# Patient Record
Sex: MALE | Race: WHITE | NOT HISPANIC OR LATINO | Employment: UNEMPLOYED | URBAN - METROPOLITAN AREA
[De-identification: names, ages, dates, MRNs, and addresses within clinical notes are randomized per-mention and may not be internally consistent; named-entity substitution may affect disease eponyms.]

---

## 2021-08-21 ENCOUNTER — HOSPITAL ENCOUNTER (EMERGENCY)
Facility: MEDICAL CENTER | Age: 31
End: 2021-08-21
Attending: EMERGENCY MEDICINE

## 2021-08-21 VITALS
HEART RATE: 92 BPM | RESPIRATION RATE: 24 BRPM | OXYGEN SATURATION: 97 % | WEIGHT: 189.6 LBS | HEIGHT: 71 IN | DIASTOLIC BLOOD PRESSURE: 98 MMHG | TEMPERATURE: 100.3 F | BODY MASS INDEX: 26.54 KG/M2 | SYSTOLIC BLOOD PRESSURE: 168 MMHG

## 2021-08-21 DIAGNOSIS — U07.1 COVID-19 VIRUS INFECTION: ICD-10-CM

## 2021-08-21 LAB
ALBUMIN SERPL BCP-MCNC: 4.1 G/DL (ref 3.2–4.9)
ALBUMIN/GLOB SERPL: 1.5 G/DL
ALP SERPL-CCNC: 90 U/L (ref 30–99)
ALT SERPL-CCNC: 15 U/L (ref 2–50)
ANION GAP SERPL CALC-SCNC: 12 MMOL/L (ref 7–16)
AST SERPL-CCNC: 20 U/L (ref 12–45)
BASOPHILS # BLD AUTO: 0.8 % (ref 0–1.8)
BASOPHILS # BLD: 0.06 K/UL (ref 0–0.12)
BILIRUB SERPL-MCNC: 0.3 MG/DL (ref 0.1–1.5)
BUN SERPL-MCNC: 20 MG/DL (ref 8–22)
CALCIUM SERPL-MCNC: 8.7 MG/DL (ref 8.4–10.2)
CHLORIDE SERPL-SCNC: 103 MMOL/L (ref 96–112)
CO2 SERPL-SCNC: 22 MMOL/L (ref 20–33)
CREAT SERPL-MCNC: 0.97 MG/DL (ref 0.5–1.4)
EOSINOPHIL # BLD AUTO: 0.05 K/UL (ref 0–0.51)
EOSINOPHIL NFR BLD: 0.7 % (ref 0–6.9)
ERYTHROCYTE [DISTWIDTH] IN BLOOD BY AUTOMATED COUNT: 42.5 FL (ref 35.9–50)
FLUAV RNA SPEC QL NAA+PROBE: NEGATIVE
FLUBV RNA SPEC QL NAA+PROBE: NEGATIVE
GLOBULIN SER CALC-MCNC: 2.8 G/DL (ref 1.9–3.5)
GLUCOSE SERPL-MCNC: 85 MG/DL (ref 65–99)
HCT VFR BLD AUTO: 43.1 % (ref 42–52)
HGB BLD-MCNC: 14.9 G/DL (ref 14–18)
IMM GRANULOCYTES # BLD AUTO: 0.01 K/UL (ref 0–0.11)
IMM GRANULOCYTES NFR BLD AUTO: 0.1 % (ref 0–0.9)
LACTATE BLD-SCNC: 1.5 MMOL/L (ref 0.5–2)
LYMPHOCYTES # BLD AUTO: 1.43 K/UL (ref 1–4.8)
LYMPHOCYTES NFR BLD: 19.9 % (ref 22–41)
MCH RBC QN AUTO: 32.1 PG (ref 27–33)
MCHC RBC AUTO-ENTMCNC: 34.6 G/DL (ref 33.7–35.3)
MCV RBC AUTO: 92.9 FL (ref 81.4–97.8)
MONOCYTES # BLD AUTO: 0.68 K/UL (ref 0–0.85)
MONOCYTES NFR BLD AUTO: 9.5 % (ref 0–13.4)
NEUTROPHILS # BLD AUTO: 4.96 K/UL (ref 1.82–7.42)
NEUTROPHILS NFR BLD: 69 % (ref 44–72)
NRBC # BLD AUTO: 0 K/UL
NRBC BLD-RTO: 0 /100 WBC
PLATELET # BLD AUTO: 237 K/UL (ref 164–446)
PMV BLD AUTO: 9.9 FL (ref 9–12.9)
POTASSIUM SERPL-SCNC: 3.8 MMOL/L (ref 3.6–5.5)
PROT SERPL-MCNC: 6.9 G/DL (ref 6–8.2)
RBC # BLD AUTO: 4.64 M/UL (ref 4.7–6.1)
RSV RNA SPEC QL NAA+PROBE: NEGATIVE
S PYO DNA SPEC NAA+PROBE: NOT DETECTED
SARS-COV-2 RNA RESP QL NAA+PROBE: DETECTED
SODIUM SERPL-SCNC: 137 MMOL/L (ref 135–145)
SPECIMEN SOURCE: ABNORMAL
WBC # BLD AUTO: 7.2 K/UL (ref 4.8–10.8)

## 2021-08-21 PROCEDURE — 700102 HCHG RX REV CODE 250 W/ 637 OVERRIDE(OP): Performed by: EMERGENCY MEDICINE

## 2021-08-21 PROCEDURE — C9803 HOPD COVID-19 SPEC COLLECT: HCPCS | Performed by: EMERGENCY MEDICINE

## 2021-08-21 PROCEDURE — 87040 BLOOD CULTURE FOR BACTERIA: CPT

## 2021-08-21 PROCEDURE — A9270 NON-COVERED ITEM OR SERVICE: HCPCS | Performed by: EMERGENCY MEDICINE

## 2021-08-21 PROCEDURE — 87651 STREP A DNA AMP PROBE: CPT

## 2021-08-21 PROCEDURE — 80053 COMPREHEN METABOLIC PANEL: CPT

## 2021-08-21 PROCEDURE — 83605 ASSAY OF LACTIC ACID: CPT

## 2021-08-21 PROCEDURE — 85025 COMPLETE CBC W/AUTO DIFF WBC: CPT

## 2021-08-21 PROCEDURE — 0241U HCHG SARS-COV-2 COVID-19 NFCT DS RESP RNA 4 TRGT MIC: CPT

## 2021-08-21 PROCEDURE — 99283 EMERGENCY DEPT VISIT LOW MDM: CPT

## 2021-08-21 RX ORDER — ACETAMINOPHEN 325 MG/1
650 TABLET ORAL ONCE
Status: COMPLETED | OUTPATIENT
Start: 2021-08-21 | End: 2021-08-21

## 2021-08-21 RX ADMIN — ACETAMINOPHEN 650 MG: 325 TABLET, FILM COATED ORAL at 20:20

## 2021-08-22 NOTE — ED TRIAGE NOTES
"Presents complaining of generalized body aches, nasal congestion, sore throat, and fever recurring throughout the day.  Chief Complaint   Patient presents with   • Sore Throat   • Body Aches   • Nasal Congestion   • Fever     BP (!) 168/98   Pulse (!) 110   Temp (!) 38.8 °C (101.8 °F) (Oral)   Resp 20   Ht 1.803 m (5' 11\")   Wt 86 kg (189 lb 9.5 oz)   SpO2 96%   BMI 26.44 kg/m²      "

## 2021-08-22 NOTE — ED PROVIDER NOTES
"ED Provider Note    CHIEF COMPLAINT  Chief Complaint   Patient presents with   • Sore Throat   • Body Aches   • Nasal Congestion   • Fever       HPI  Jhon Greco is a 31 y.o. male who presents with complaints of fever, chills, sore throat, runny nose, since earlier this morning.  Patient said he woke up this morning and felt like he was having a fever and chills.  He has had occasional cough which is been nonproductive.  He has been tolerating food and fluids, denies any nausea, vomiting, or diarrhea.  He still has his sense of taste and smell.  Patient denies any sick contacts.  He has not had any significant headache, or muscle aches and pains.  The patient has not been vaccinated for Covid.    REVIEW OF SYSTEMS  See HPI for further details. All other systems are negative.     PAST MEDICAL HISTORY  No past medical history on file.    FAMILY HISTORY  No family history on file.    SOCIAL HISTORY  Social History     Tobacco Use   • Smoking status: Not on file   Substance Use Topics   • Alcohol use: Not on file   • Drug use: Not on file      Social History     Substance and Sexual Activity   Drug Use Not on file       SURGICAL HISTORY  No past surgical history on file.    CURRENT MEDICATIONS  Home Medications    **Home medications have not yet been reviewed for this encounter**         ALLERGIES  No Known Allergies    PHYSICAL EXAM0  VITAL SIGNS: Blood Pressure (Abnormal) 168/98   Pulse 92   Temperature 37.9 °C (100.3 °F) (Oral)   Respiration (Abnormal) 24   Height 1.803 m (5' 11\")   Weight 86 kg (189 lb 9.5 oz)   Oxygen Saturation 97%   Body Mass Index 26.44 kg/m²   Constitutional: Awake, alert, in no acute distress, Non-toxic appearance.   HENT: Atraumatic. Bilateral external ears normal, mucous membranes moist, throat is mildly erythematous without exudates, no swelling to the soft or uvula, nose is normal.  Eyes: PERRL, EOMI, conjunctiva moist, noninjected.  Neck: Nontender, Normal range of motion, No " "nuchal rigidity, No stridor.   Lymphatic: No lymphadenopathy noted.   Cardiovascular: Regular rate and rhythm, no murmurs, rubs, gallops.  Thorax & Lungs:  Good breath sounds bilaterally, no wheezes, rales, or retractions.  No chest tenderness.  Abdomen: Bowel sounds normal, Soft, nontender, nondistended, no rebound, guarding, masses.  Back: No CVA or spinal tenderness.  Extremities: Intact distal pulses, No edema, No tenderness.   Skin: Warm, slightly diaphoretic, no rashes.  Musculoskeletal: No joint swelling or tenderness.  Neurologic: Alert & oriented x 3, sensory and motor function normal. No focal deficits.   Psychiatric: Affect normal, Judgment normal, Mood normal.           LABS  Labs Reviewed   CBC WITH DIFFERENTIAL - Abnormal; Notable for the following components:       Result Value    RBC 4.64 (*)     Lymphocytes 19.90 (*)     All other components within normal limits   COV-2, FLU A/B, AND RSV BY PCR - Abnormal; Notable for the following components:    SARS-CoV-2 by PCR DETECTED (*)     All other components within normal limits    Narrative:     Have you been in close contact with a person who is suspected  or known to be positive for COVID-19 within the last 30 days  (e.g. last seen that person < 30 days ago)->Unknown   COMP METABOLIC PANEL   BLOOD CULTURE    Narrative:     1 of 2 for Blood Culture x 2 sites order. Per Hospital  Policy: Only change Specimen Src: to \"Line\" if specified by  physician order.   BLOOD CULTURE    Narrative:     2 of 2 blood culture x2  Sites order. Per Hospital Policy:  Only change Specimen Src: to \"Line\" if specified by physician  order.   GROUP A STREP BY PCR    Narrative:     Have you been in close contact with a person who is suspected  or known to be positive for COVID-19 within the last 30 days  (e.g. last seen that person < 30 days ago)->Unknown   LACTIC ACID   ESTIMATED GFR   LACTIC ACID   LACTIC ACID       All labs reviewed by me.      RADIOLOGY/PROCEDURES  No orders " to display       The radiologist's interpretations of all radiological studies have been reviewed by me.        COURSE & MEDICAL DECISION MAKING  Pertinent Labs & Imaging studies reviewed. (See chart for details)  The patient presents with the above complaints.  He was febrile with a temperature of 101.8 and tachycardic with a heart rate of 110.  Oxygen saturation is good at 96% on room air.  Because of the possibility for Covid infection he was given IV fluids.  He was given Tylenol 650 mg orally for his fever.    CBC is normal with a white count 7200, normal differential, chemistry profile normal, group A strep by PCR was negative.  Influenza by PCR was negative, Covid by PCR was detected.  Patient appears to have a Covid virus infection.  He is not hypoxic, does not have any significant coughing at this time.  Findings were discussed with the patient.  He is still he will need to quarantine.  He is to return to the ER for any worsening symptoms, difficulty breathing, dizziness, vomiting, or any other problems.        FINAL IMPRESSION  1. COVID-19 virus infection          Electronically signed by: Wade Bliss M.D., 8/21/2021 8:20 PM

## 2021-08-22 NOTE — ED NOTES
Desmond from Lab called with critical result of positive COVID at 2154. Critical lab result read back to Desmond.   Dr. Bliss notified of critical lab result at 2154.  Critical lab result read back by Dr. Bliss.

## 2021-08-26 LAB
BACTERIA BLD CULT: NORMAL
BACTERIA BLD CULT: NORMAL
SIGNIFICANT IND 70042: NORMAL
SIGNIFICANT IND 70042: NORMAL
SITE SITE: NORMAL
SITE SITE: NORMAL
SOURCE SOURCE: NORMAL
SOURCE SOURCE: NORMAL

## 2021-12-13 ENCOUNTER — HOSPITAL ENCOUNTER (EMERGENCY)
Facility: MEDICAL CENTER | Age: 31
End: 2021-12-13
Attending: EMERGENCY MEDICINE
Payer: MEDICAID

## 2021-12-13 ENCOUNTER — APPOINTMENT (OUTPATIENT)
Dept: RADIOLOGY | Facility: MEDICAL CENTER | Age: 31
End: 2021-12-13
Attending: EMERGENCY MEDICINE
Payer: MEDICAID

## 2021-12-13 VITALS
TEMPERATURE: 98.8 F | BODY MASS INDEX: 27.04 KG/M2 | WEIGHT: 193.12 LBS | SYSTOLIC BLOOD PRESSURE: 156 MMHG | DIASTOLIC BLOOD PRESSURE: 98 MMHG | HEIGHT: 71 IN | HEART RATE: 85 BPM | OXYGEN SATURATION: 95 % | RESPIRATION RATE: 18 BRPM

## 2021-12-13 DIAGNOSIS — S62.622A CLOSED DISPLACED FRACTURE OF MIDDLE PHALANX OF RIGHT MIDDLE FINGER, INITIAL ENCOUNTER: ICD-10-CM

## 2021-12-13 PROCEDURE — 99283 EMERGENCY DEPT VISIT LOW MDM: CPT

## 2021-12-13 PROCEDURE — 73140 X-RAY EXAM OF FINGER(S): CPT | Mod: RT

## 2021-12-13 RX ORDER — NAPROXEN 500 MG/1
500 TABLET ORAL 2 TIMES DAILY PRN
Qty: 20 TABLET | Refills: 0 | Status: SHIPPED | OUTPATIENT
Start: 2021-12-13

## 2021-12-13 ASSESSMENT — FIBROSIS 4 INDEX: FIB4 SCORE: 0.68

## 2021-12-14 NOTE — ED TRIAGE NOTES
"Chief Complaint   Patient presents with   • Sent by MD boudreaux finger about a week ago, PCP took xray today and it showed fracture in middle finger      BP (!) 173/88   Pulse 84   Temp 36.3 °C (97.4 °F) (Temporal)   Resp 14   Ht 1.803 m (5' 11\")   Wt 87.6 kg (193 lb 2 oz)   SpO2 97%   BMI 26.94 kg/m²     Has this patient been vaccinated for COVID no  If not, would they like to be vaccinated while in the ER if eligible?  no  Would the patient like to speak with the ERP about the possibility of receiving the COVID vaccine today before making a decision? no    "

## 2021-12-14 NOTE — ED PROVIDER NOTES
CHIEF COMPLAINT  Chief Complaint   Patient presents with   • Sent by MD     smashed finger about a week ago, PCP took xray today and it showed fracture in middle finger        HPI  Jhon Greco is a 31 y.o. male who presents after he smashed his finger about a week ago.  He went to see his PCP today who took an x-ray and apparently was fractured.  I do not have access to the records for that.  The patient notes no lacerations.  He notes pain mostly in the distal aspect of the digit.  No paresthesias.  No other injuries.    REVIEW OF SYSTEMS  No paresthesias or weakness    PAST MEDICAL HISTORY  No past medical history on file.    FAMILY HISTORY  No family history on file.    SOCIAL HISTORY  Social History     Socioeconomic History   • Marital status: Single     Spouse name: Not on file   • Number of children: Not on file   • Years of education: Not on file   • Highest education level: Not on file   Occupational History   • Not on file   Tobacco Use   • Smoking status: Never Smoker   • Smokeless tobacco: Never Used   Vaping Use   • Vaping Use: Never used   Substance and Sexual Activity   • Alcohol use: Not Currently   • Drug use: Not Currently   • Sexual activity: Not on file   Other Topics Concern   • Not on file   Social History Narrative   • Not on file     Social Determinants of Health     Financial Resource Strain:    • Difficulty of Paying Living Expenses: Not on file   Food Insecurity:    • Worried About Running Out of Food in the Last Year: Not on file   • Ran Out of Food in the Last Year: Not on file   Transportation Needs:    • Lack of Transportation (Medical): Not on file   • Lack of Transportation (Non-Medical): Not on file   Physical Activity:    • Days of Exercise per Week: Not on file   • Minutes of Exercise per Session: Not on file   Stress:    • Feeling of Stress : Not on file   Social Connections:    • Frequency of Communication with Friends and Family: Not on file   • Frequency of Social Gatherings  "with Friends and Family: Not on file   • Attends [employer] Services: Not on file   • Active Member of Clubs or Organizations: Not on file   • Attends Club or Organization Meetings: Not on file   • Marital Status: Not on file   Intimate Partner Violence:    • Fear of Current or Ex-Partner: Not on file   • Emotionally Abused: Not on file   • Physically Abused: Not on file   • Sexually Abused: Not on file   Housing Stability:    • Unable to Pay for Housing in the Last Year: Not on file   • Number of Places Lived in the Last Year: Not on file   • Unstable Housing in the Last Year: Not on file       SURGICAL HISTORY  No past surgical history on file.    CURRENT MEDICATIONS  Home Medications    **Home medications have not yet been reviewed for this encounter**         ALLERGIES  No Known Allergies    PHYSICAL EXAM  VITAL SIGNS: BP (!) 173/88   Pulse 84   Temp 36.3 °C (97.4 °F) (Temporal)   Resp 14   Ht 1.803 m (5' 11\")   Wt 87.6 kg (193 lb 2 oz)   SpO2 97%   BMI 26.94 kg/m²      Constitutional: Well developed, Well nourished, No acute distress, Non-toxic appearance.   HENT: Normocephalic, Atraumatic  Cardiovascular: Regular pulse  Lungs: No respiratory distress  Skin: Warm, Dry, no rash  Extremities: Right middle finger demonstrates some soft tissue swelling around the DIP and distal in the finger, there is no evidence of subungual hematoma, there is no erythema and no evidence of abscess, flexion extension at both the PIP and DIP is intact, sensation is normal, cap refill is normal  Neurologic: Alert, appropriate, follows commands  Psychiatric: Affect normal    RADIOLOGY/PROCEDURES  DX-FINGER(S) 2+ RIGHT   Final Result      Minimally displaced intra-articular fracture of the third distal phalangeal base.            COURSE & MEDICAL DECISION MAKING  Pertinent Labs & Imaging studies reviewed. (See chart for details)  This is a 31-year-old male who presents with a intra-articular fracture that goes into the DIP " joint. It is minimally displaced. The patient appears to be neurovascularly intact. He will be splinted and given follow-up with the [facility].    FINAL IMPRESSION  1. Finger fracture  2.   3.         Electronically signed by: Mekhi Keith M.D., 12/13/2021 5:51 PM

## 2022-08-31 ENCOUNTER — HOSPITAL ENCOUNTER (EMERGENCY)
Facility: MEDICAL CENTER | Age: 32
End: 2022-08-31
Attending: EMERGENCY MEDICINE
Payer: MEDICAID

## 2022-08-31 ENCOUNTER — APPOINTMENT (OUTPATIENT)
Dept: RADIOLOGY | Facility: MEDICAL CENTER | Age: 32
End: 2022-08-31
Attending: EMERGENCY MEDICINE
Payer: MEDICAID

## 2022-08-31 VITALS
TEMPERATURE: 99 F | OXYGEN SATURATION: 94 % | WEIGHT: 195.11 LBS | HEIGHT: 71 IN | HEART RATE: 80 BPM | DIASTOLIC BLOOD PRESSURE: 80 MMHG | RESPIRATION RATE: 18 BRPM | SYSTOLIC BLOOD PRESSURE: 170 MMHG | BODY MASS INDEX: 27.31 KG/M2

## 2022-08-31 DIAGNOSIS — L03.116 CELLULITIS OF LEFT LOWER EXTREMITY: ICD-10-CM

## 2022-08-31 DIAGNOSIS — T14.8XXA PUNCTURE WOUND: ICD-10-CM

## 2022-08-31 LAB
BASOPHILS # BLD AUTO: 0.7 % (ref 0–1.8)
BASOPHILS # BLD: 0.06 K/UL (ref 0–0.12)
EOSINOPHIL # BLD AUTO: 0.14 K/UL (ref 0–0.51)
EOSINOPHIL NFR BLD: 1.6 % (ref 0–6.9)
ERYTHROCYTE [DISTWIDTH] IN BLOOD BY AUTOMATED COUNT: 41.6 FL (ref 35.9–50)
HCT VFR BLD AUTO: 40.2 % (ref 42–52)
HGB BLD-MCNC: 14.2 G/DL (ref 14–18)
IMM GRANULOCYTES # BLD AUTO: 0.02 K/UL (ref 0–0.11)
IMM GRANULOCYTES NFR BLD AUTO: 0.2 % (ref 0–0.9)
LYMPHOCYTES # BLD AUTO: 2.13 K/UL (ref 1–4.8)
LYMPHOCYTES NFR BLD: 24.1 % (ref 22–41)
MCH RBC QN AUTO: 32.1 PG (ref 27–33)
MCHC RBC AUTO-ENTMCNC: 35.3 G/DL (ref 33.7–35.3)
MCV RBC AUTO: 90.7 FL (ref 81.4–97.8)
MONOCYTES # BLD AUTO: 0.69 K/UL (ref 0–0.85)
MONOCYTES NFR BLD AUTO: 7.8 % (ref 0–13.4)
NEUTROPHILS # BLD AUTO: 5.81 K/UL (ref 1.82–7.42)
NEUTROPHILS NFR BLD: 65.6 % (ref 44–72)
NRBC # BLD AUTO: 0 K/UL
NRBC BLD-RTO: 0 /100 WBC
PLATELET # BLD AUTO: 256 K/UL (ref 164–446)
PMV BLD AUTO: 10.2 FL (ref 9–12.9)
RBC # BLD AUTO: 4.43 M/UL (ref 4.7–6.1)
WBC # BLD AUTO: 8.9 K/UL (ref 4.8–10.8)

## 2022-08-31 PROCEDURE — 36415 COLL VENOUS BLD VENIPUNCTURE: CPT

## 2022-08-31 PROCEDURE — 96365 THER/PROPH/DIAG IV INF INIT: CPT

## 2022-08-31 PROCEDURE — 73630 X-RAY EXAM OF FOOT: CPT | Mod: LT

## 2022-08-31 PROCEDURE — 85025 COMPLETE CBC W/AUTO DIFF WBC: CPT

## 2022-08-31 PROCEDURE — 700105 HCHG RX REV CODE 258: Performed by: EMERGENCY MEDICINE

## 2022-08-31 PROCEDURE — 99284 EMERGENCY DEPT VISIT MOD MDM: CPT

## 2022-08-31 PROCEDURE — 90715 TDAP VACCINE 7 YRS/> IM: CPT | Performed by: EMERGENCY MEDICINE

## 2022-08-31 PROCEDURE — 90471 IMMUNIZATION ADMIN: CPT

## 2022-08-31 PROCEDURE — 700111 HCHG RX REV CODE 636 W/ 250 OVERRIDE (IP): Performed by: EMERGENCY MEDICINE

## 2022-08-31 RX ORDER — SULFAMETHOXAZOLE AND TRIMETHOPRIM 800; 160 MG/1; MG/1
1 TABLET ORAL 2 TIMES DAILY
Qty: 14 TABLET | Refills: 0 | Status: SHIPPED | OUTPATIENT
Start: 2022-08-31 | End: 2022-09-07

## 2022-08-31 RX ORDER — AMOXICILLIN AND CLAVULANATE POTASSIUM 875; 125 MG/1; MG/1
1 TABLET, FILM COATED ORAL 2 TIMES DAILY
Qty: 20 TABLET | Refills: 0 | Status: SHIPPED | OUTPATIENT
Start: 2022-08-31 | End: 2022-09-10

## 2022-08-31 RX ADMIN — CLOSTRIDIUM TETANI TOXOID ANTIGEN (FORMALDEHYDE INACTIVATED), CORYNEBACTERIUM DIPHTHERIAE TOXOID ANTIGEN (FORMALDEHYDE INACTIVATED), BORDETELLA PERTUSSIS TOXOID ANTIGEN (GLUTARALDEHYDE INACTIVATED), BORDETELLA PERTUSSIS FILAMENTOUS HEMAGGLUTININ ANTIGEN (FORMALDEHYDE INACTIVATED), BORDETELLA PERTUSSIS PERTACTIN ANTIGEN, AND BORDETELLA PERTUSSIS FIMBRIAE 2/3 ANTIGEN 0.5 ML: 5; 2; 2.5; 5; 3; 5 INJECTION, SUSPENSION INTRAMUSCULAR at 20:22

## 2022-08-31 RX ADMIN — SODIUM CHLORIDE 3 G: 900 INJECTION INTRAVENOUS at 20:24

## 2022-08-31 ASSESSMENT — FIBROSIS 4 INDEX: FIB4 SCORE: 0.7

## 2022-09-01 NOTE — ED PROVIDER NOTES
"ED Provider Note    CHIEF COMPLAINT   Chief Complaint   Patient presents with    Wound Check     Pt stepped on a nail with his L foot on Monday. Redness, swelling, warm to touch. Last tetanus 4/2014.        HPI   Jhon Greco is a 32 y.o. male who presents with swelling, redness to the distal foot, injured 2 days ago when a nail went through his shoe into his distal foot.  The puncture wound is healing nicely.  He is developed redness and swelling to the dorsum of his foot with increased pain.  Patient felt chills and subjective fever today.  No dizziness.  Presents with normal heart rate and temperature.  Patient denies diabetes.  Last tetanus shot approximately 8 years ago.    REVIEW OF SYSTEMS   Musculoskeletal: Left foot pain  Neurologic: No numbness  Skin: Redness and swelling dorsum distal left foot, puncture wound plantar surface  Endocrine: No diabetes  Constitutional: Subjective fever today      PAST MEDICAL HISTORY   History reviewed. No pertinent past medical history.    FAMILY HISTORY  History reviewed. No pertinent family history.    SOCIAL HISTORY  Social History     Socioeconomic History    Marital status: Single   Tobacco Use    Smoking status: Never    Smokeless tobacco: Never   Vaping Use    Vaping Use: Never used   Substance and Sexual Activity    Alcohol use: Not Currently    Drug use: Not Currently       SURGICAL HISTORY  History reviewed. No pertinent surgical history.    CURRENT MEDICATIONS   Home Medications       Reviewed by Kylie Kim R.N. (Registered Nurse) on 08/31/22 at 1909  Med List Status: Not Addressed     Medication Last Dose Status   naproxen (NAPROSYN) 500 MG Tab  Active                    ALLERGIES   No Known Allergies    PHYSICAL EXAM  VITAL SIGNS: BP (!) 169/90   Pulse 96   Temp 36.9 °C (98.4 °F) (Temporal)   Resp 17   Ht 1.803 m (5' 11\")   Wt 88.5 kg (195 lb 1.7 oz)   SpO2 95%   BMI 27.21 kg/m²   Skin: Faint erythematous change with slight swelling distal " dorsum of the foot proximal to toes 2 through 4.  No subcutaneous emphysema or crepitance.  Small puncture wound noted, plantar surface of the foot shows no evidence of cellulitis..  No proximal lymphangitis  Vascular: Intact distal capillary refill.   Musculoskeletal: Distal foot is tender to palpation as noted above.  His left ankle is nontender with range of motion good, no evidence of intra-articular infection.  Neurologic: Sensation is normal left foot    RADIOLOGY/PROCEDURES  DX-FOOT-COMPLETE 3+ LEFT   Final Result      No acute findings or radiopaque foreign body identified.        Results for orders placed or performed during the hospital encounter of 08/31/22   CBC WITH DIFFERENTIAL   Result Value Ref Range    WBC 8.9 4.8 - 10.8 K/uL    RBC 4.43 (L) 4.70 - 6.10 M/uL    Hemoglobin 14.2 14.0 - 18.0 g/dL    Hematocrit 40.2 (L) 42.0 - 52.0 %    MCV 90.7 81.4 - 97.8 fL    MCH 32.1 27.0 - 33.0 pg    MCHC 35.3 33.7 - 35.3 g/dL    RDW 41.6 35.9 - 50.0 fL    Platelet Count 256 164 - 446 K/uL    MPV 10.2 9.0 - 12.9 fL    Neutrophils-Polys 65.60 44.00 - 72.00 %    Lymphocytes 24.10 22.00 - 41.00 %    Monocytes 7.80 0.00 - 13.40 %    Eosinophils 1.60 0.00 - 6.90 %    Basophils 0.70 0.00 - 1.80 %    Immature Granulocytes 0.20 0.00 - 0.90 %    Nucleated RBC 0.00 /100 WBC    Neutrophils (Absolute) 5.81 1.82 - 7.42 K/uL    Lymphs (Absolute) 2.13 1.00 - 4.80 K/uL    Monos (Absolute) 0.69 0.00 - 0.85 K/uL    Eos (Absolute) 0.14 0.00 - 0.51 K/uL    Baso (Absolute) 0.06 0.00 - 0.12 K/uL    Immature Granulocytes (abs) 0.02 0.00 - 0.11 K/uL    NRBC (Absolute) 0.00 K/uL         COURSE & MEDICAL DECISION MAKING  Pertinent Labs & Imaging studies reviewed. (See chart for details)  Patient presents slightly hypertensive but otherwise normal vital signs.  No fever.  White blood cell count is normal, there is no evidence of sepsis.  Patient is developed redness and swelling to the distal foot 2 days after a puncture wound, will be  prescribed antibiotics.  He was given an IV dose of Unasyn.  Plan to continue Augmentin and Bactrim.  X-ray negative for fracture or foreign body.  I discussed with the patient the possibility of worsening infection despite the antibiotics and the need to return should symptoms worsen.  There is no evidence of purulence on exam, no palpable abscess.  Patient is advised to avoid any activity causing his foot pain and to keep the infected area elevated when possible.    FINAL IMPRESSION     1. Cellulitis of left lower extremity  amoxicillin-clavulanate (AUGMENTIN) 875-125 MG Tab    sulfamethoxazole-trimethoprim (BACTRIM DS) 800-160 MG tablet      2. Puncture wound                  Electronically signed by: Joseph Fischer M.D., 8/31/2022 8:06 PM

## 2022-09-01 NOTE — ED TRIAGE NOTES
"Chief Complaint   Patient presents with    Wound Check     Pt stepped on a nail with his L foot on Monday. Redness, swelling, warm to touch. Last tetanus 4/2014.      BP (!) 169/90   Pulse 96   Temp 36.9 °C (98.4 °F) (Temporal)   Resp 17   Ht 1.803 m (5' 11\")   Wt 88.5 kg (195 lb 1.7 oz)   SpO2 95%   BMI 27.21 kg/m²     Pt ambulated into triage, mask in place. NAD, encouraged to return to the triage nurse or tech with any new complaints or symptoms.  "

## 2022-12-12 ENCOUNTER — APPOINTMENT (OUTPATIENT)
Dept: RADIOLOGY | Facility: MEDICAL CENTER | Age: 32
End: 2022-12-12
Attending: EMERGENCY MEDICINE
Payer: MEDICAID

## 2022-12-12 ENCOUNTER — HOSPITAL ENCOUNTER (EMERGENCY)
Facility: MEDICAL CENTER | Age: 32
End: 2022-12-12
Attending: EMERGENCY MEDICINE
Payer: MEDICAID

## 2022-12-12 VITALS
WEIGHT: 201.5 LBS | HEIGHT: 71 IN | DIASTOLIC BLOOD PRESSURE: 108 MMHG | RESPIRATION RATE: 16 BRPM | BODY MASS INDEX: 28.21 KG/M2 | HEART RATE: 78 BPM | OXYGEN SATURATION: 97 % | SYSTOLIC BLOOD PRESSURE: 166 MMHG | TEMPERATURE: 98.3 F

## 2022-12-12 DIAGNOSIS — M25.521 RIGHT ELBOW PAIN: ICD-10-CM

## 2022-12-12 PROCEDURE — 73080 X-RAY EXAM OF ELBOW: CPT | Mod: RT

## 2022-12-12 PROCEDURE — 99283 EMERGENCY DEPT VISIT LOW MDM: CPT

## 2022-12-12 RX ORDER — NAPROXEN 500 MG/1
500 TABLET ORAL 2 TIMES DAILY WITH MEALS
Qty: 60 TABLET | Refills: 0 | Status: SHIPPED | OUTPATIENT
Start: 2022-12-12

## 2022-12-12 ASSESSMENT — FIBROSIS 4 INDEX: FIB4 SCORE: 0.65

## 2022-12-13 NOTE — DISCHARGE INSTRUCTIONS
You were seen in the Emergency Department for elbow pain.    Xrays were completed without significant acute abnormalities.    Please use naproxen scheduled daily for the next 1-2 weeks.  Use additional 1,000mg of tylenol every 6 hours as needed for pain.    Try to buy a brace for tennis elbow or elbow tenditis and wear it with activity.    Please follow up with your primary care physician.    Return to the Emergency Department with worsening pain, redness, swelling, fevers, or other concernws.

## 2022-12-13 NOTE — ED NOTES
Patient given discharge instructions and education. Verbalizes understanding. Given chance to ask questions. Patient educated on signs and symptoms to returned to ER, Educated patient they can return for any concerning symptoms. One prescription sent to pharmacy on file. Education given to patient about medication. Patient states they have their belongings. Denies additional needs at this time. Reports pain is well controlled. Patient monitored every hour and PRN for safety and comfort. Patient ambulatory out of department.

## 2022-12-13 NOTE — ED TRIAGE NOTES
"Chief Complaint   Patient presents with    Elbow Pain     Pt c/o R elbow pain for approx 1.5 months. Pt denies trauma but states there is pain whenever he puts pressure on it. He states it has worsened over the past couples days and hurts more in the morning \" because I sleep on it.\"       Ambulatory to triage for above complaint.   Educated on triage process, encourage to inform staff of any changes.     BP (!) 159/118   Pulse 96   Temp 36.2 °C (97.1 °F) (Temporal)   Resp 16   Ht 1.803 m (5' 11\")   Wt 91.4 kg (201 lb 8 oz)   SpO2 98%   BMI 28.10 kg/m²     "

## 2022-12-13 NOTE — ED PROVIDER NOTES
"ED Provider Note    CHIEF COMPLAINT  Chief Complaint   Patient presents with    Elbow Pain     Pt c/o R elbow pain for approx 1.5 months. Pt denies trauma but states there is pain whenever he puts pressure on it. He states it has worsened over the past couples days and hurts more in the morning \" because I sleep on it.\"       HPI  Jhon Greco is a 32 y.o. male who presents to the Emergency Department with right elbow pain.  Patient states it has been ongoing for the last 6 weeks.  Reports hitting it against a trash can while putting the trash out when it first started however has persisted since that time.  He works as a  and this is his dominant arm he uses for work.  No fevers.  Tried ibuprofen sporadically without significant improvement.    REVIEW OF SYSTEMS  See HPI for further details.   Positive for right elbow pain  Negative for fevers, numbness or weakness in distal extremity.    PAST MEDICAL HISTORY       SOCIAL HISTORY  Social History     Tobacco Use    Smoking status: Never    Smokeless tobacco: Never   Vaping Use    Vaping Use: Never used   Substance and Sexual Activity    Alcohol use: Not Currently    Drug use: Not Currently    Sexual activity: Not on file       SURGICAL HISTORY  patient denies any surgical history    CURRENT MEDICATIONS  Home Medications       Reviewed by Jaz Rasmussen R.N. (Registered Nurse) on 12/12/22 at 1703  Med List Status: Not Addressed     Medication Last Dose Status   naproxen (NAPROSYN) 500 MG Tab  Active                    ALLERGIES  No Known Allergies    PHYSICAL EXAM  VITAL SIGNS: BP (!) 166/108   Pulse 78   Temp 36.8 °C (98.3 °F) (Temporal)   Resp 16   Ht 1.803 m (5' 11\")   Wt 91.4 kg (201 lb 8 oz)   SpO2 97%   BMI 28.10 kg/m²    Constitutional: Well appearing. Alert in no apparent distress.  HENT: Normocephalic, Atraumatic. Bilateral external ears normal. Nose normal. Moist mucous membranes.  Neck: Supple, full range of motion.  Eyes: Pupils are " "equal and reactive. Conjunctiva normal.   Skin: Warm, Dry. No rash.   Musculoskeletal: Atraumatic, no deformities noted. Mild swelling and tenderness noted over the lateral aspect of the right elbow.  Full range of motion without pain.  No overlying erythema or warmth.  Neurologic: Alert and oriented. Moving all extremities spontaneously  Psychiatric: Affect normal, Mood normal. Appears appropriate and not intoxicated.       DIAGNOSTIC STUDIES      RADIOLOGY  Personally reviewed by me  DX-ELBOW-COMPLETE 3+ RIGHT   Final Result      No evidence of acute fracture or dislocation.            ED COURSE  Vitals:    12/12/22 1653 12/12/22 1701 12/12/22 1857   BP: (!) 159/118  (!) 166/108   Pulse: 96  78   Resp: 16  16   Temp: 36.2 °C (97.1 °F)  36.8 °C (98.3 °F)   TempSrc: Temporal  Temporal   SpO2: 98%  97%   Weight:  91.4 kg (201 lb 8 oz)    Height:  1.803 m (5' 11\")          Medications administered:  Medications - No data to display        MEDICAL DECISION MAKING  Patient who works as a  presents with 6 week history of elbow pain.  He is hypertensive on arrival with otherwise normal vitals.  No signs of overlying infectious process including septic joint, bursitis.  XR negative for fracture or joint effusion.  Suspect tendinis therefore will discharge home with instructions on bracing and scheduled antiinflammatories as well as outpatient followup with orthopedics if not improved. Patient understands plan of care and strict return precautions for changing or worsening symptoms.        IMPRESSION  (M25.521) Right elbow pain    Disposition: Discharge home, stable condition    The patient is referred to a primary physician for blood pressure management, diabetic screening, and for all other preventative health concerns.    Discharge Medication List as of 12/12/2022  6:53 PM        START taking these medications    Details   !! naproxen (NAPROSYN) 500 MG Tab Take 1 Tablet by mouth 2 times a day with meals., " Disp-60 Tablet, R-0, Normal       !! - Potential duplicate medications found. Please discuss with provider.            Electronically signed by: Lashae Marcano M.D., 12/12/2022 6:36 PM

## 2023-12-19 ENCOUNTER — APPOINTMENT (OUTPATIENT)
Dept: RADIOLOGY | Facility: MEDICAL CENTER | Age: 33
End: 2023-12-19
Attending: STUDENT IN AN ORGANIZED HEALTH CARE EDUCATION/TRAINING PROGRAM
Payer: MEDICAID

## 2023-12-19 ENCOUNTER — HOSPITAL ENCOUNTER (EMERGENCY)
Facility: MEDICAL CENTER | Age: 33
End: 2023-12-19
Attending: STUDENT IN AN ORGANIZED HEALTH CARE EDUCATION/TRAINING PROGRAM
Payer: MEDICAID

## 2023-12-19 VITALS
BODY MASS INDEX: 29.26 KG/M2 | RESPIRATION RATE: 16 BRPM | WEIGHT: 209 LBS | HEIGHT: 71 IN | HEART RATE: 100 BPM | SYSTOLIC BLOOD PRESSURE: 159 MMHG | TEMPERATURE: 98.5 F | OXYGEN SATURATION: 94 % | DIASTOLIC BLOOD PRESSURE: 102 MMHG

## 2023-12-19 DIAGNOSIS — J10.1 INFLUENZA B: ICD-10-CM

## 2023-12-19 DIAGNOSIS — R51.9 ACUTE NONINTRACTABLE HEADACHE, UNSPECIFIED HEADACHE TYPE: ICD-10-CM

## 2023-12-19 DIAGNOSIS — E86.0 DEHYDRATION: ICD-10-CM

## 2023-12-19 LAB
ALBUMIN SERPL BCP-MCNC: 4.3 G/DL (ref 3.2–4.9)
ALBUMIN/GLOB SERPL: 1.4 G/DL
ALP SERPL-CCNC: 73 U/L (ref 30–99)
ALT SERPL-CCNC: 35 U/L (ref 2–50)
ANION GAP SERPL CALC-SCNC: 13 MMOL/L (ref 7–16)
AST SERPL-CCNC: 22 U/L (ref 12–45)
BASOPHILS # BLD AUTO: 1 % (ref 0–1.8)
BASOPHILS # BLD: 0.06 K/UL (ref 0–0.12)
BILIRUB SERPL-MCNC: 0.3 MG/DL (ref 0.1–1.5)
BUN SERPL-MCNC: 11 MG/DL (ref 8–22)
CALCIUM ALBUM COR SERPL-MCNC: 8.7 MG/DL (ref 8.5–10.5)
CALCIUM SERPL-MCNC: 8.9 MG/DL (ref 8.4–10.2)
CHLORIDE SERPL-SCNC: 101 MMOL/L (ref 96–112)
CO2 SERPL-SCNC: 20 MMOL/L (ref 20–33)
CREAT SERPL-MCNC: 1.01 MG/DL (ref 0.5–1.4)
EOSINOPHIL # BLD AUTO: 0.03 K/UL (ref 0–0.51)
EOSINOPHIL NFR BLD: 0.5 % (ref 0–6.9)
ERYTHROCYTE [DISTWIDTH] IN BLOOD BY AUTOMATED COUNT: 40.8 FL (ref 35.9–50)
FLUAV RNA SPEC QL NAA+PROBE: NEGATIVE
FLUBV RNA SPEC QL NAA+PROBE: POSITIVE
GFR SERPLBLD CREATININE-BSD FMLA CKD-EPI: 100 ML/MIN/1.73 M 2
GLOBULIN SER CALC-MCNC: 3 G/DL (ref 1.9–3.5)
GLUCOSE SERPL-MCNC: 103 MG/DL (ref 65–99)
HCT VFR BLD AUTO: 43.5 % (ref 42–52)
HGB BLD-MCNC: 15.3 G/DL (ref 14–18)
IMM GRANULOCYTES # BLD AUTO: 0.01 K/UL (ref 0–0.11)
IMM GRANULOCYTES NFR BLD AUTO: 0.2 % (ref 0–0.9)
LYMPHOCYTES # BLD AUTO: 0.83 K/UL (ref 1–4.8)
LYMPHOCYTES NFR BLD: 14.3 % (ref 22–41)
MCH RBC QN AUTO: 32.1 PG (ref 27–33)
MCHC RBC AUTO-ENTMCNC: 35.2 G/DL (ref 32.3–36.5)
MCV RBC AUTO: 91.4 FL (ref 81.4–97.8)
MONOCYTES # BLD AUTO: 0.68 K/UL (ref 0–0.85)
MONOCYTES NFR BLD AUTO: 11.7 % (ref 0–13.4)
NEUTROPHILS # BLD AUTO: 4.19 K/UL (ref 1.82–7.42)
NEUTROPHILS NFR BLD: 72.3 % (ref 44–72)
NRBC # BLD AUTO: 0 K/UL
NRBC BLD-RTO: 0 /100 WBC (ref 0–0.2)
PLATELET # BLD AUTO: 233 K/UL (ref 164–446)
PMV BLD AUTO: 10.5 FL (ref 9–12.9)
POTASSIUM SERPL-SCNC: 3.6 MMOL/L (ref 3.6–5.5)
PROT SERPL-MCNC: 7.3 G/DL (ref 6–8.2)
RBC # BLD AUTO: 4.76 M/UL (ref 4.7–6.1)
RSV RNA SPEC QL NAA+PROBE: NEGATIVE
SARS-COV-2 RNA RESP QL NAA+PROBE: NOTDETECTED
SODIUM SERPL-SCNC: 134 MMOL/L (ref 135–145)
SPECIMEN SOURCE: ABNORMAL
WBC # BLD AUTO: 5.8 K/UL (ref 4.8–10.8)

## 2023-12-19 PROCEDURE — 96375 TX/PRO/DX INJ NEW DRUG ADDON: CPT

## 2023-12-19 PROCEDURE — 71045 X-RAY EXAM CHEST 1 VIEW: CPT

## 2023-12-19 PROCEDURE — 80053 COMPREHEN METABOLIC PANEL: CPT

## 2023-12-19 PROCEDURE — 0241U HCHG SARS-COV-2 COVID-19 NFCT DS RESP RNA 4 TRGT MIC: CPT

## 2023-12-19 PROCEDURE — 96374 THER/PROPH/DIAG INJ IV PUSH: CPT

## 2023-12-19 PROCEDURE — 36415 COLL VENOUS BLD VENIPUNCTURE: CPT

## 2023-12-19 PROCEDURE — 99284 EMERGENCY DEPT VISIT MOD MDM: CPT

## 2023-12-19 PROCEDURE — 85025 COMPLETE CBC W/AUTO DIFF WBC: CPT

## 2023-12-19 PROCEDURE — 700105 HCHG RX REV CODE 258: Performed by: STUDENT IN AN ORGANIZED HEALTH CARE EDUCATION/TRAINING PROGRAM

## 2023-12-19 PROCEDURE — C9803 HOPD COVID-19 SPEC COLLECT: HCPCS | Performed by: EMERGENCY MEDICINE

## 2023-12-19 PROCEDURE — 700111 HCHG RX REV CODE 636 W/ 250 OVERRIDE (IP): Performed by: STUDENT IN AN ORGANIZED HEALTH CARE EDUCATION/TRAINING PROGRAM

## 2023-12-19 RX ORDER — ONDANSETRON 2 MG/ML
4 INJECTION INTRAMUSCULAR; INTRAVENOUS ONCE
Status: COMPLETED | OUTPATIENT
Start: 2023-12-19 | End: 2023-12-19

## 2023-12-19 RX ORDER — KETOROLAC TROMETHAMINE 30 MG/ML
15 INJECTION, SOLUTION INTRAMUSCULAR; INTRAVENOUS ONCE
Status: COMPLETED | OUTPATIENT
Start: 2023-12-19 | End: 2023-12-19

## 2023-12-19 RX ORDER — OSELTAMIVIR PHOSPHATE 75 MG/1
75 CAPSULE ORAL 2 TIMES DAILY
Qty: 10 CAPSULE | Refills: 0 | Status: ACTIVE | OUTPATIENT
Start: 2023-12-19

## 2023-12-19 RX ORDER — OSELTAMIVIR PHOSPHATE 75 MG/1
75 CAPSULE ORAL 2 TIMES DAILY
Qty: 10 CAPSULE | Refills: 0 | Status: ACTIVE | OUTPATIENT
Start: 2023-12-19 | End: 2023-12-19

## 2023-12-19 RX ORDER — SODIUM CHLORIDE 9 MG/ML
1000 INJECTION, SOLUTION INTRAVENOUS ONCE
Status: COMPLETED | OUTPATIENT
Start: 2023-12-19 | End: 2023-12-19

## 2023-12-19 RX ADMIN — ONDANSETRON 4 MG: 2 INJECTION INTRAMUSCULAR; INTRAVENOUS at 18:35

## 2023-12-19 RX ADMIN — SODIUM CHLORIDE 1000 ML: 9 INJECTION, SOLUTION INTRAVENOUS at 18:34

## 2023-12-19 RX ADMIN — KETOROLAC TROMETHAMINE 15 MG: 30 INJECTION, SOLUTION INTRAMUSCULAR; INTRAVENOUS at 18:35

## 2023-12-20 PROCEDURE — 99284 EMERGENCY DEPT VISIT MOD MDM: CPT

## 2023-12-20 PROCEDURE — 96374 THER/PROPH/DIAG INJ IV PUSH: CPT

## 2023-12-20 ASSESSMENT — FIBROSIS 4 INDEX: FIB4 SCORE: 0.53

## 2023-12-20 NOTE — ED PROVIDER NOTES
ED Provider Note    CHIEF COMPLAINT  Chief Complaint   Patient presents with    Body Aches     Started this morning, pt has body aches only in the legs. Pt took tylenol around 1650, and has not has not helped his body aches or headache.     Headache     Back and front throbbing headache.     Diarrhea       EXTERNAL RECORDS REVIEWED  External ED Note Seen at [facility] 10/23 for puncture wound to foot    HPI/ROS  LIMITATION TO HISTORY   Select: : None  OUTSIDE HISTORIAN(S):  None    Jhon Greco is a 33 y.o. male who presents with flu like symptoms.  Patient says that he started to develop symptoms yesterday however they have worsened today.  He reports hot and cold sweats but no measured temperatures.  He does have cough and congestion.  He says that he started to have body aches, mostly  in his legs.  He took Tylenol around 5 PM without significant improvement.  He has also developed a throbbing frontal headache today.  He does have a history of similar headaches.  He denies any associated neck pain.  Today has had two episodes of watery diarrhea.  He has not had any vomiting but does feel nauseated.  No known sick contacts.  He denies any chronic medical problems or immunocompromising condition.    PAST MEDICAL HISTORY    Patient denies    SURGICAL HISTORY  patient denies any surgical history    FAMILY HISTORY  History reviewed. No pertinent family history.    SOCIAL HISTORY  Social History     Tobacco Use    Smoking status: Never    Smokeless tobacco: Never   Vaping Use    Vaping Use: Never used   Substance and Sexual Activity    Alcohol use: Not Currently    Drug use: Not Currently    Sexual activity: Not on file       CURRENT MEDICATIONS  Home Medications       Reviewed by Nicolás Guerrier R.N. (Registered Nurse) on 12/19/23 at 1756  Med List Status: Partial     Medication Last Dose Status   naproxen (NAPROSYN) 500 MG Tab  Active   naproxen (NAPROSYN) 500 MG Tab  Active                    ALLERGIES  No  "Known Allergies    PHYSICAL EXAM  VITAL SIGNS: BP (!) 159/102   Pulse 100   Temp 36.9 °C (98.5 °F) (Temporal)   Resp 16   Ht 1.803 m (5' 11\")   Wt 94.8 kg (208 lb 15.9 oz)   SpO2 94%   BMI 29.15 kg/m²    Constitutional: Awake and alert . Non toxic  HENT: Normal inspection  . Dry mucous membranes  Eyes: Normal inspection  Neck: Grossly normal range of motion.  Cardiovascular: Normal heart rate, Normal rhythm.   Thorax & Lungs: No respiratory distress, No wheezing, No rales, No rhonchi  Abdomen: Soft, non-distended, nontender to palpation in all 4 quadrants, no mass  Skin: No obvious rash.  Extremities: Warm, well perfused. No clubbing, cyanosis, edema   Neurologic: Grossly normal   Psychiatric: Normal for situation      DIAGNOSTIC STUDIES / PROCEDURES    LABS  Results for orders placed or performed during the hospital encounter of 12/19/23   CoV-2, FLU A/B, and RSV by PCR (2-4 Hours CEPHEID) : Collect NP swab in VTM    Specimen: Nasal; Respirate   Result Value Ref Range    Influenza virus A RNA Negative Negative    Influenza virus B, PCR POSITIVE (A) Negative    RSV, PCR Negative Negative    SARS-CoV-2 by PCR NotDetected     SARS-CoV-2 Source NP Swab    CBC WITH DIFFERENTIAL   Result Value Ref Range    WBC 5.8 4.8 - 10.8 K/uL    RBC 4.76 4.70 - 6.10 M/uL    Hemoglobin 15.3 14.0 - 18.0 g/dL    Hematocrit 43.5 42.0 - 52.0 %    MCV 91.4 81.4 - 97.8 fL    MCH 32.1 27.0 - 33.0 pg    MCHC 35.2 32.3 - 36.5 g/dL    RDW 40.8 35.9 - 50.0 fL    Platelet Count 233 164 - 446 K/uL    MPV 10.5 9.0 - 12.9 fL    Neutrophils-Polys 72.30 (H) 44.00 - 72.00 %    Lymphocytes 14.30 (L) 22.00 - 41.00 %    Monocytes 11.70 0.00 - 13.40 %    Eosinophils 0.50 0.00 - 6.90 %    Basophils 1.00 0.00 - 1.80 %    Immature Granulocytes 0.20 0.00 - 0.90 %    Nucleated RBC 0.00 0.00 - 0.20 /100 WBC    Neutrophils (Absolute) 4.19 1.82 - 7.42 K/uL    Lymphs (Absolute) 0.83 (L) 1.00 - 4.80 K/uL    Monos (Absolute) 0.68 0.00 - 0.85 K/uL    Eos " (Absolute) 0.03 0.00 - 0.51 K/uL    Baso (Absolute) 0.06 0.00 - 0.12 K/uL    Immature Granulocytes (abs) 0.01 0.00 - 0.11 K/uL    NRBC (Absolute) 0.00 K/uL   COMP METABOLIC PANEL   Result Value Ref Range    Sodium 134 (L) 135 - 145 mmol/L    Potassium 3.6 3.6 - 5.5 mmol/L    Chloride 101 96 - 112 mmol/L    Co2 20 20 - 33 mmol/L    Anion Gap 13.0 7.0 - 16.0    Glucose 103 (H) 65 - 99 mg/dL    Bun 11 8 - 22 mg/dL    Creatinine 1.01 0.50 - 1.40 mg/dL    Calcium 8.9 8.4 - 10.2 mg/dL    Correct Calcium 8.7 8.5 - 10.5 mg/dL    AST(SGOT) 22 12 - 45 U/L    ALT(SGPT) 35 2 - 50 U/L    Alkaline Phosphatase 73 30 - 99 U/L    Total Bilirubin 0.3 0.1 - 1.5 mg/dL    Albumin 4.3 3.2 - 4.9 g/dL    Total Protein 7.3 6.0 - 8.2 g/dL    Globulin 3.0 1.9 - 3.5 g/dL    A-G Ratio 1.4 g/dL   ESTIMATED GFR   Result Value Ref Range    GFR (CKD-EPI) 100 >60 mL/min/1.73 m 2         RADIOLOGY  I have independently interpreted the diagnostic imaging associated with this visit and am waiting the final reading from the radiologist.   My preliminary interpretation is as follows: No focal infiltrate  Radiologist interpretation:   DX-CHEST-PORTABLE (1 VIEW)   Final Result      No acute cardiopulmonary abnormality.            COURSE & MEDICAL DECISION MAKING    ED Observation Status? Yes; I am placing the patient in to an observation status due to a diagnostic uncertainty as well as therapeutic intensity. Patient placed in observation status at 6:18 PM, 12/19/2023.     Observation plan is as follows: IV, Labs, Fluids    Upon Reevaluation, the patient's condition has: Improved; and will be discharged.    Patient discharged from ED Observation status at 8:00 PM 12/19/2023    INITIAL ASSESSMENT, COURSE AND PLAN  Care Narrative: This is a 33-year-old male with no chronic medical problems who presents with flulike symptoms.  On arrival he is mildly hypertensive but otherwise has normal vital signs.  He is afebrile, hemodynamically stable. He is not  systemically ill appearing.   His labs are overall reassuring.  He has no leukocytosis, no significant electrolyte derangements.  Chest x-ray without focal opacity to suggest pneumonia.   Patient's influenza swab is positive.    I suspect his headache is most likely in the setting of viral illness.  He was given IV fluids and Toradol.  I have low suspicion for emergent etiology such as intracranial bleed or mass and no indication for advanced imaging.  He is nontoxic, no meningismus and I doubt CNS infection.   He reports significant improvement in symptoms after IV Toradol and IV fluids.  Patient is in the time window for Tamiflu.  Prescription provided.  He continues to look well with normal vital signs and was discharged home in good condition.    HYDRATION: Based on the patient's presentation of Acute Diarrhea the patient was given IV fluids. IV Hydration was used because oral hydration was not adequate alone. Upon recheck following hydration, the patient was improved.        DISPOSITION AND DISCUSSIONS  I have discussed management of the patient with the following physicians and CARLA's:  None    Discussion of management with other QHP or appropriate source(s): None     Escalation of care considered, and ultimately not performed:diagnostic imaging    Barriers to care at this time, including but not limited to:  None .     Decision tools and prescription drugs considered including, but not limited to: Antivirals Tamiflu .    FINAL DIAGNOSIS  1. Influenza B Acute   2. Acute nonintractable headache, unspecified headache type Acute   3. Dehydration Acute          Electronically signed by: Carla Wesley M.D., 12/19/2023 6:06 PM

## 2023-12-20 NOTE — ED TRIAGE NOTES
"Chief Complaint   Patient presents with    Body Aches     Started this morning, pt has body aches only in the legs. Pt took tylenol around 1650, and has not has not helped his body aches or headache.     Headache     Back and front throbbing headache.     Diarrhea     BP (!) 159/102   Pulse 100   Temp 36.9 °C (98.5 °F) (Temporal)   Resp 16   Ht 1.803 m (5' 11\")   Wt 94.8 kg (208 lb 15.9 oz)   SpO2 94%   BMI 29.15 kg/m²     "

## 2023-12-20 NOTE — ED NOTES
PT cleared for discharge home pt has no further questions or concerns  pt to f/u with pcp 1-2days pt verbalized understanding. pt advised to return to closest ED for any worsenign symptotms  Education x1 RX provided

## 2023-12-21 ENCOUNTER — HOSPITAL ENCOUNTER (EMERGENCY)
Facility: MEDICAL CENTER | Age: 33
End: 2023-12-21
Attending: STUDENT IN AN ORGANIZED HEALTH CARE EDUCATION/TRAINING PROGRAM
Payer: MEDICAID

## 2023-12-21 VITALS
OXYGEN SATURATION: 94 % | TEMPERATURE: 100 F | DIASTOLIC BLOOD PRESSURE: 77 MMHG | BODY MASS INDEX: 29.75 KG/M2 | HEIGHT: 71 IN | HEART RATE: 90 BPM | SYSTOLIC BLOOD PRESSURE: 147 MMHG | RESPIRATION RATE: 18 BRPM | WEIGHT: 212.52 LBS

## 2023-12-21 DIAGNOSIS — J11.1 INFLUENZA: ICD-10-CM

## 2023-12-21 PROCEDURE — 700111 HCHG RX REV CODE 636 W/ 250 OVERRIDE (IP): Performed by: STUDENT IN AN ORGANIZED HEALTH CARE EDUCATION/TRAINING PROGRAM

## 2023-12-21 PROCEDURE — 700105 HCHG RX REV CODE 258: Performed by: STUDENT IN AN ORGANIZED HEALTH CARE EDUCATION/TRAINING PROGRAM

## 2023-12-21 PROCEDURE — 700102 HCHG RX REV CODE 250 W/ 637 OVERRIDE(OP): Performed by: STUDENT IN AN ORGANIZED HEALTH CARE EDUCATION/TRAINING PROGRAM

## 2023-12-21 PROCEDURE — A9270 NON-COVERED ITEM OR SERVICE: HCPCS | Performed by: STUDENT IN AN ORGANIZED HEALTH CARE EDUCATION/TRAINING PROGRAM

## 2023-12-21 RX ORDER — ACETAMINOPHEN 325 MG/1
650 TABLET ORAL ONCE
Status: COMPLETED | OUTPATIENT
Start: 2023-12-21 | End: 2023-12-21

## 2023-12-21 RX ORDER — SODIUM CHLORIDE, SODIUM LACTATE, POTASSIUM CHLORIDE, CALCIUM CHLORIDE 600; 310; 30; 20 MG/100ML; MG/100ML; MG/100ML; MG/100ML
1000 INJECTION, SOLUTION INTRAVENOUS ONCE
Status: COMPLETED | OUTPATIENT
Start: 2023-12-21 | End: 2023-12-21

## 2023-12-21 RX ORDER — KETOROLAC TROMETHAMINE 30 MG/ML
15 INJECTION, SOLUTION INTRAMUSCULAR; INTRAVENOUS ONCE
Status: COMPLETED | OUTPATIENT
Start: 2023-12-21 | End: 2023-12-21

## 2023-12-21 RX ADMIN — SODIUM CHLORIDE, POTASSIUM CHLORIDE, SODIUM LACTATE AND CALCIUM CHLORIDE 1000 ML: 600; 310; 30; 20 INJECTION, SOLUTION INTRAVENOUS at 01:04

## 2023-12-21 RX ADMIN — KETOROLAC TROMETHAMINE 15 MG: 30 INJECTION, SOLUTION INTRAMUSCULAR; INTRAVENOUS at 01:04

## 2023-12-21 RX ADMIN — ACETAMINOPHEN 650 MG: 325 TABLET ORAL at 01:00

## 2023-12-21 ASSESSMENT — PAIN DESCRIPTION - PAIN TYPE: TYPE: ACUTE PAIN

## 2023-12-21 NOTE — DISCHARGE INSTRUCTIONS
As we discussed you can use ibuprofen every 6 hours as needed for pain.  You should supplement with Pedialyte or Gatorade for hydration.  You have uncontrolled vomiting, chest pain, trouble breathing you should return to the emergency room.

## 2023-12-21 NOTE — ED TRIAGE NOTES
"Chief Complaint   Patient presents with    Fever    Cough    Runny Nose     Pt reports since 3 days he's been having fever and other flu like symptoms like cough, runny nose and sneezing, headache and body aches. Came her yesterday in the ED tested Influenza positive     BP (!) 192/106   Pulse (!) 102   Temp (!) 38.8 °C (101.8 °F) (Oral)   Resp 20   Ht 1.803 m (5' 11\")   Wt 96.4 kg (212 lb 8.4 oz)   SpO2 97%   BMI 29.64 kg/m²     "

## 2023-12-21 NOTE — ED PROVIDER NOTES
"ED Provider Note    CHIEF COMPLAINT  Chief Complaint   Patient presents with    Fever    Cough    Runny Nose     Pt reports since 3 days he's been having fever and other flu like symptoms like cough, runny nose and sneezing, headache and body aches. Came her yesterday in the ED tested Influenza positive       EXTERNAL RECORDS REVIEWED  Outpatient Notes ED visit from 12/19/2023 where patient was evaluated for body aches, headache and diarrhea tested positive for influenza discharged home after symptom control    HPI/ROS  LIMITATION TO HISTORY     OUTSIDE HISTORIAN(S):      Jhon Greco is a 33 y.o. male who presents with bodyaches, decreased p.o. intake and cough.  Patient says that he was diagnosed with flu yesterday and he has been eating and drinking very little and having ongoing body aches.  Patient denies shortness of breath, chest pain, vomiting.    PAST MEDICAL HISTORY       SURGICAL HISTORY  patient denies any surgical history    FAMILY HISTORY  History reviewed. No pertinent family history.    SOCIAL HISTORY  Social History     Tobacco Use    Smoking status: Never    Smokeless tobacco: Never   Vaping Use    Vaping Use: Never used   Substance and Sexual Activity    Alcohol use: Not Currently    Drug use: Not Currently    Sexual activity: Not on file       CURRENT MEDICATIONS  Home Medications       Reviewed by Delores Jack R.N. (Registered Nurse) on 12/21/23 at 0051  Med List Status: Not Addressed     Medication Last Dose Status   naproxen (NAPROSYN) 500 MG Tab  Active   naproxen (NAPROSYN) 500 MG Tab  Active   oseltamivir (TAMIFLU) 75 MG Cap  Active                    ALLERGIES  No Known Allergies    PHYSICAL EXAM  VITAL SIGNS: BP (!) 147/77   Pulse 90   Temp 37.8 °C (100 °F) (Oral)   Resp 18   Ht 1.803 m (5' 11\")   Wt 96.4 kg (212 lb 8.4 oz)   SpO2 94%   BMI 29.64 kg/m²    Constitutional: Alert in no apparent distress.  HENT: No signs of trauma, Bilateral external ears normal, Nose normal.  Mucous " membranes  Eyes: Pupils are equal and reactive, Conjunctiva normal, Non-icteric.   Neck: Normal range of motion, No tenderness, Supple, No stridor.   Cardiovascular: Regular rate and rhythm, no murmurs.   Thorax & Lungs: Normal breath sounds, No respiratory distress, No wheezing, No chest tenderness.   Abdomen: Bowel sounds normal, Soft, No tenderness, No masses, No pulsatile masses.   Skin: Warm, Dry, No erythema, No rash.   Back: No bony tenderness, No CVA tenderness.   Extremities: Intact distal pulses, No edema, No tenderness, No cyanosis  Musculoskeletal: Good range of motion in all major joints. No tenderness to palpation or major deformities noted.   Neurologic: Alert , Normal motor function, Normal sensory function, No focal deficits noted.       DIAGNOSTIC STUDIES / PROCEDURES  EKG      LABS      RADIOLOGY      COURSE & MEDICAL DECISION MAKING    ED Observation Status?     INITIAL ASSESSMENT, COURSE AND PLAN  Care Narrative: Reviewed emergency room visit from yesterday where patient describes referral notes had unremarkable CBC and metabolic panel, chest chest x-ray without signs of pneumonia.  Patient presented again with ongoing body aches and some degree of dehydration.  Patient without hypoxemia or respiratory distress.  Will initiate IV fluids and NSAIDs and reassess.    On reassessment patient able to ambulate and tolerate p.o. without difficulty reports improvement after IV fluids and Toradol.  Patient discharged with return precautions instructions on supportive care.  HYDRATION: Based on the patient's presentation of Dehydration the patient was given IV fluids. IV Hydration was used because oral hydration was not adequate alone. Upon recheck following hydration, the patient was improved.      ADDITIONAL PROBLEM LIST    DISPOSITION AND DISCUSSIONS  Barriers to care at this time, including but not limited to: Patient does not have established PCP.         FINAL DIAGNOSIS  1. Influenza            Electronically signed by: Nando Mustafa D.O., 12/21/2023 2:22 AM

## 2023-12-21 NOTE — ED NOTES
Discharged in stable condition, alert and oriented, ambulatory. follow up appointment instructed. Health education imparted. Instructed to come back once symptoms worsened. Pt verbalized understanding of the information given. Removed IV line and applied pressure.    Providers


Date of admission: 


01/18/21 15:44





Expected date of discharge: 01/21/21


Attending physician: 


Lidya Myers





Consults: 





                                        





01/18/21 15:44


Consult Physician Stat 


   Consulting Provider: Andres Vides


   Consult Reason/Comments: critical care


   Do you want consulting provider notified?: Yes





01/18/21 15:45


Consult Physician Urgent 


   Consulting Provider: Lavelle Roman


   Consult Reason/Comments: arf


   Do you want consulting provider notified?: Yes





01/18/21 16:10


Consult Physician Urgent 


   Consulting Provider: Kevin Rader


   Consult Reason/Comments: chest pain, ekg changes


   Do you want consulting provider notified?: Yes











Primary care physician: 


Lidya Myers





[facility] Hospital Course: 





This is a 59-year-old  male patient requesting my service with past 

medical history of rheumatoid arthritis on Arava that was diagnosed when he was 

36 year old initially was started on Methotrexate that he could not tolerate 

then placed on Embrel but he developed side effects and finally was tried on 

Humira injection but he developed neurologic sided effects and was hospitaized 

at [facility] for quiet some time, rheumatoid lung disease, 

previous history of loculated empyema with chest tube placement in July 2020, 

hypertension, hypothyroidism, history of DVT, remote history of tobacco use and 

dependence, was recently seen by Dr. Gómez and he was placed on two antibiotics 

and 2 steroids and he was feeling better that wa about 2 weeks ago  Patient 

complains of generalized weakness and tingling going on for a few days along 

with nausea, vomiting and diarrhea.  He has been unable to keep any food down.  

He denies having any abdominal pain.  He complains of feeling fatigued and achy 

all over.  Patient was found to be afebrile but mildly tachycardic in the low 

100s, hypotensive with blood pressure 69/43, pulse ox 73% on room air.  Patient 

received 3 L of fluid and Decadron in the emergency center.  A central line was 

placed and patient was started on norepinephrine drip.  EKG was sinus 

tachycardia with incomplete right bundle branch block.  WBC 20.7, hemoglobin 

10.6, platelet count 3 and 45.  Sodium 134, potassium 3.6, chloride 100, CO2 16,

BUN 72 and creatinine 7.63.  Blood sugar 106.  CK 36.  Liver function tests 

normal.  Lactic acid 2.4.  Troponin negative.  Coronavirus PCR not detected.  

Chest x-ray reveals cardiomegaly with patchy mid and lower lung bases with small

left effusion.  Consider pneumonia or sequela of CHF.  Patient received IV 

Levaquin and Zosyn in the emergency center.  He was admitted to the intensive 

care unit and started on a bicarb drip and continued on vasopressors.  Consult 

with pulmonary medicine, nephrology and cardiology.





1/19: Patient remains in the intensive care unit.  He states that he feels 100% 

better.  He has had good urine output.  He states he has had some nausea with 

eating.  Stools this morning were a little loose.  Patient has been on a bicarb 

drip currently off levo fed.  He states his foot pain is improved from ye sterday.  Sputum culture has not been obtained.  WBC 17.8, hemoglobin 11.9, 

platelet count 335.  Sodium 136, but isn't 4.3, chloride 102, CO2 16, BUN 62 and

creatinine 5.13.  Blood sugar 266.  Alkaline phosphatase 133.





1/20: Patient remains in intensive care unit but has been cleared for transfer 

to the cardiac stepdown unit.  Patient verbalizes that he thinks he is ready to 

go home.  Diarrhea has resolved.  He denies any nausea vomiting.  He is eating 

well but appetite is only okay.  He has had good urine output.  Pulse ox is 95% 

on room air.  He has been afebrile, heart rate 86, blood pressure 106/80.  

Repeat blood work reveals WBC 16.1, hemoglobin 9.4, platelet count 289.  CO2 31,

BUN 15 creatinine 3.25.  Blood sugar 128.  C. difficile toxin negative.  Patient

is off vasopressor, off sodium bicarb.





1/21: Patient is seen today on the [facility] floor.  He has been afebrile, heart 

rate 90, blood pressure 112/71, pulse ox 94% on room air.  Repeat blood work 

reveals normal electrolytes, BUN 42 and creatinine 1.84.  Urine culture has been

finalized with no growth.  Blood culture no growth at 48 hours.  Patient states 

he is feeling well.  No diarrhea.  No cough or chest pain.  Nephrology has 

decreased IV fluids to 70 miles per hour.  Marino catheter to be removed today.  

Patient continued to be monitored for I&O.  Lopressor has been restarted by 

pulmonary medicine.  Dr. Buck is reviewed echocardiogram with ejection 

fraction of about 50%.  Metoprolol resumed and hold Ace inhibitor.  Patient also

will be started on a baby aspirin.  Plan is to evaluate for ischemic heart dis

ease as outpatient.  Patient is anxious to be discharged home and requesting to 

leave.  Renal function is steadily improving.  We'll plan for discharge home 

today with follow-up in the office next week.  She will be discharged home in 

stable condition. 








Discharge diagnoses:


1.  Acute hypoxic respiratory failure secondary to hypovolemia from nausea 

vomiting diarrhea. 


2.  Acute kidney injury secondary to acute tubular necosis due to hypotension 

and fluid loss.  


3.  Anion gap metabolic acidosis and lactic acidosis secondary to renal failure.

 


4.  Hypovolemic shock secondary to dehydration requiring vasopressors in the 

form of Levophed.  


5.  SIRS with hypotension, tachycardia and leukocytosis. 


6.  Possible pneumonia ruled out by pulmonary medicine, scar tissue. 


7.  Possible gastroenteritis causing nausea vomiting diarrhea ruled out C.diff 

infection.


8.  Rheumatoid arthritis and rheumatoid lung. 


9.  History of empyema with Streptococcus requiring chest tube.


10. Hypertension and hypertensive cardiovascular disease. 


11. Hypothyroidism. 





Discharge plan: Most likely home





Impression and plan of care have been directed as dictated by the signing 

physician.  Phyllis Wylie nurse practitioner acting as scribe for signing 

physician.





Patient Condition at Discharge: Good





Plan - Discharge Summary


New Discharge Prescriptions: 


New


   Aspirin 81 mg PO HS  chew


   Metoprolol Succinate (ER) [Toprol XL] 25 mg PO DAILY #30 tab.er.24h





Continue


   Levothyroxine Sodium [Synthroid] 150 mcg PO DAILY


   Albuterol Inhaler [Ventolin Hfa Inhaler] 2 puff INHALATION RT-QID PRN


     PRN Reason: Shortness Of Breath


   Cholecalciferol [Vitamin D3 (25 Mcg = 1000 Iu)] 2,000 unit PO HS


   Ipratropium-Albuterol Nebulize [Duoneb 0.5 mg-3 mg/3 ml Soln] 3 ml INHALATION

RT-QID PRN


     PRN Reason: Shortness Of Breath


   Ascorbic Acid [Vitamin C] 2,000 mg PO HS





Discontinued


   Losartan-Hctz 50-12.5 mg [Hyzaar 50-12.5] 1 tab PO DAILY


   Metoprolol Tartrate [Lopressor] 50 mg PO HS


   Furosemide [Lasix] 20 mg PO DAILY PRN


     PRN Reason: Edema


Discharge Medication List





Levothyroxine Sodium [Synthroid] 150 mcg PO DAILY 03/29/17 [History]


Albuterol Inhaler [Ventolin Hfa Inhaler] 2 puff INHALATION RT-QID PRN 06/10/20 

[History]


Ascorbic Acid [Vitamin C] 2,000 mg PO HS 12/23/20 [History]


Cholecalciferol [Vitamin D3 (25 Mcg = 1000 Iu)] 2,000 unit PO HS 12/23/20 

[History]


Ipratropium-Albuterol Nebulize [Duoneb 0.5 mg-3 mg/3 ml Soln] 3 ml INHALATION 

RT-QID PRN 12/23/20 [History]


Aspirin 81 mg PO HS  chew 01/21/21 [Rx]


Metoprolol Succinate (ER) [Toprol XL] 25 mg PO DAILY #30 tab.er.24h 01/21/21 

[Rx]








Follow up Appointment(s)/Referral(s): 


Sandeep Santo MD [STAFF PHYSICIAN] - 2 Weeks


Lidya Myers MD [Primary Care Provider] - 01/27/21 12:45 pm (Please bring with 

you a list of your medications and dosages)


Lavelle Roman DO [STAFF PHYSICIAN] - 1 Week


Patient Instructions/Handouts:  Acute Kidney Injury (DC)


Discharge Disposition: HOME SELF-CARE

## 2025-03-01 ENCOUNTER — HOSPITAL ENCOUNTER (EMERGENCY)
Facility: MEDICAL CENTER | Age: 35
End: 2025-03-01
Attending: EMERGENCY MEDICINE

## 2025-03-01 VITALS
OXYGEN SATURATION: 97 % | TEMPERATURE: 98.2 F | WEIGHT: 209 LBS | HEART RATE: 79 BPM | BODY MASS INDEX: 29.26 KG/M2 | HEIGHT: 71 IN | RESPIRATION RATE: 18 BRPM | DIASTOLIC BLOOD PRESSURE: 97 MMHG | SYSTOLIC BLOOD PRESSURE: 169 MMHG

## 2025-03-01 DIAGNOSIS — L60.0 INGROWN TOENAIL: ICD-10-CM

## 2025-03-01 PROCEDURE — 99282 EMERGENCY DEPT VISIT SF MDM: CPT

## 2025-03-01 PROCEDURE — 11730 AVULSION NAIL PLATE SIMPLE 1: CPT

## 2025-03-01 PROCEDURE — 700101 HCHG RX REV CODE 250: Performed by: EMERGENCY MEDICINE

## 2025-03-01 RX ORDER — LIDOCAINE HYDROCHLORIDE AND EPINEPHRINE BITARTRATE 20; .01 MG/ML; MG/ML
10 INJECTION, SOLUTION SUBCUTANEOUS ONCE
Status: COMPLETED | OUTPATIENT
Start: 2025-03-01 | End: 2025-03-01

## 2025-03-01 RX ADMIN — LIDOCAINE HYDROCHLORIDE AND EPINEPHRINE 10 ML: 10; 20 INJECTION, SOLUTION INFILTRATION; PERINEURAL at 12:45

## 2025-03-01 ASSESSMENT — FIBROSIS 4 INDEX: FIB4 SCORE: 0.54

## 2025-03-01 NOTE — ED PROVIDER NOTES
"ED Provider Note    CHIEF COMPLAINT  Chief Complaint   Patient presents with    Other     LT great toe pain x 2 wks and worsening   Nail ingrown  Some pus disch Denies fever       HPI/ROS    Jhon Greco is a 34 y.o. male who presents with pain and swelling to the nail folds of the left great toe.  This has been progressing over the last couple of weeks.  He states he has noticed some drainage.  He does not have any associated fevers.  He is localized discomfort to the left great toe.  The patient is otherwise healthy.    PAST MEDICAL HISTORY   has a past medical history of Patient denies medical problems.    SURGICAL HISTORY   has a past surgical history that includes no pertinent past surgical history.    FAMILY HISTORY  History reviewed. No pertinent family history.    SOCIAL HISTORY  Social History     Tobacco Use    Smoking status: Never    Smokeless tobacco: Never   Vaping Use    Vaping status: Never Used   Substance and Sexual Activity    Alcohol use: Not Currently    Drug use: Not Currently    Sexual activity: Not on file       CURRENT MEDICATIONS  Home Medications    **Home medications have not yet been reviewed for this encounter**         ALLERGIES  No Known Allergies    PHYSICAL EXAM  VITAL SIGNS: BP (!) 160/108   Pulse 69   Temp 36.8 °C (98.2 °F) (Temporal)   Resp 16   Ht 1.803 m (5' 11\")   Wt 94.8 kg (208 lb 15.9 oz)   SpO2 95%   BMI 29.15 kg/m²    In general the patient does not appear toxic     Extremities the patient has swelling of the nail folds medially and laterally of the left great toe with an ingrown toenail.  He does not have any pain or swelling proximal to the IP joint    Skin the nail fold swelling described above    Neurovascular examination is grossly intact to the left foot      PROCEDURES left great toenail removal  A digital block was performed with lidocaine and epinephrine.  The left great toe was cleansed with Betadine.  Subsequently utilized a needle  to remove the " nail without any difficulty.  There is no purulent drainage.  The nailbed was irrigated and antibiotic ointment and a dressing was applied.      COURSE & MEDICAL DECISION MAKING    This is a 34-year-old male who presents the emergency room with a ingrown toenail of the left great toe.  The toenail was resected.  The patient will be discharged home with instructions for wound care including warm compresses.  He will return for any signs of infection.      The patient is aware his blood pressure is elevated needs to be followed up on an outpatient basis.    FINAL DIAGNOSIS  1.  Left great ingrown toenail  2.  Left first toenail resection    Disposition  The patient will be discharged in stable condition     Electronically signed by: Berto Sam M.D., 3/1/2025 12:21 PM

## 2025-03-01 NOTE — DISCHARGE INSTRUCTIONS
Utilize warm soaks and compresses as discussed.  Your blood pressure is elevated needs to be followed up on an outpatient basis in 10 to 14 days.

## 2025-03-25 ENCOUNTER — HOSPITAL ENCOUNTER (EMERGENCY)
Facility: MEDICAL CENTER | Age: 35
End: 2025-03-25
Attending: EMERGENCY MEDICINE

## 2025-03-25 VITALS
HEIGHT: 71 IN | BODY MASS INDEX: 29.57 KG/M2 | RESPIRATION RATE: 18 BRPM | SYSTOLIC BLOOD PRESSURE: 141 MMHG | TEMPERATURE: 97 F | OXYGEN SATURATION: 96 % | DIASTOLIC BLOOD PRESSURE: 98 MMHG | HEART RATE: 86 BPM | WEIGHT: 211.2 LBS

## 2025-03-25 DIAGNOSIS — L60.0 INGROWN TOENAIL: ICD-10-CM

## 2025-03-25 PROCEDURE — 700111 HCHG RX REV CODE 636 W/ 250 OVERRIDE (IP): Performed by: EMERGENCY MEDICINE

## 2025-03-25 PROCEDURE — 99282 EMERGENCY DEPT VISIT SF MDM: CPT

## 2025-03-25 PROCEDURE — 11730 AVULSION NAIL PLATE SIMPLE 1: CPT

## 2025-03-25 RX ORDER — BUPIVACAINE HYDROCHLORIDE 5 MG/ML
10 INJECTION, SOLUTION EPIDURAL; INTRACAUDAL; PERINEURAL ONCE
Status: COMPLETED | OUTPATIENT
Start: 2025-03-25 | End: 2025-03-25

## 2025-03-25 RX ADMIN — BUPIVACAINE HYDROCHLORIDE 10 ML: 5 INJECTION, SOLUTION EPIDURAL; INTRACAUDAL at 11:50

## 2025-03-25 ASSESSMENT — FIBROSIS 4 INDEX: FIB4 SCORE: 0.54

## 2025-03-25 NOTE — ED PROVIDER NOTES
"ED Provider Note        CHIEF COMPLAINT  Chief Complaint   Patient presents with    Digit Pain     Right great toe   Pt states he thinks its an ingrown toe nail   Swelling and pain in toe           HPI      Jhon Greco is a 34 y.o. male who presents to the Emergency Department with worsening pain to his right great toe.  He reports that over the past week that this has been worsening.  It is similar episode on the other side that required removal of the toenail.  Denies any fevers or trauma    REVIEW OF SYSTEMS  See HPI for further details. All other systems are negative.     PAST MEDICAL HISTORY     Past Medical History:   Diagnosis Date    Patient denies medical problems        SURGICAL HISTORY  Past Surgical History:   Procedure Laterality Date    NO PERTINENT PAST SURGICAL HISTORY         FAMILY HISTORY  No family history on file.    SOCIAL HISTORY    reports that he has never smoked. He has never used smokeless tobacco. He reports that he does not currently use alcohol. He reports that he does not currently use drugs.    CURRENT MEDICATIONS  Home Medications    **Home medications have not yet been reviewed for this encounter**         ALLERGIES  No Known Allergies    PHYSICAL EXAM  VITAL SIGNS: BP (!) 141/98   Pulse 86   Temp 36.1 °C (97 °F) (Temporal)   Resp 18   Ht 1.803 m (5' 11\")   Wt 95.8 kg (211 lb 3.2 oz)   SpO2 96%   BMI 29.46 kg/m²   Gen: Alert, no acute distress  Ext: No deformities.  Right great toe with ingrown toenail on both medial and lateral sides.  No spreading erythema.  No deformity.  Distal CSM intact  Neuro: speech fluent    DIAGNOSTIC STUDIES / PROCEDURES    Procedure toenail removal  Indication:  Ingrown toenail  Anesthesia was obtained using 0.5% bupivacaine without epinephrine in a digital block.  After cleaning with chlorhexidine, needle  and scissors were used to remove the toenail.  There are no immediate complications.        COURSE & MEDICAL DECISION " MAKING  Pertinent Labs & Imaging studies were reviewed. (See chart for details)      EXTERNAL RECORDS REVIEWED  3/1/2025 removal of left toenail at this Emergency Department         INITIAL ASSESSMENT AND PLAN  Care Narrative: Patient with ingrown right toenail.  No evidence of infection.  Patient will be referred to follow-up with podiatry.  Nail was removed successfully.    ADDITIONAL PROBLEM LIST AND DISPOSITION      Escalation of care considered, and ultimately not performed: diagnostic imaging.     Barriers to care at this time, including but not limited to: Patient does not have established PCP.     Decision tools and prescription drugs considered including, but not limited to: Antibiotics no infection identified .          Patient is referred to primary care provider for blood pressure, diabetes and all other preventative health services.  Patient was given return precautions, anticipatory guidance, and the opportunity ask questions prior to discharge        FINAL IMPRESSION  1. Ingrown toenail           DISPOSITION:  Patient will be discharged home in stable condition.    FOLLOW UP:  [facility], Emergency Dept  [address]  [place] [address]  [phone]    If symptoms worsen        This dictation was created using voice recognition software. The accuracy of the dictation is limited to the abilities of the software. I expect there may be some errors of grammar and possibly content. The nursing notes were reviewed and certain aspects of this information were incorporated into this note.

## 2025-03-25 NOTE — DISCHARGE INSTRUCTIONS
You were seen in the emergency department for an ingrown toenail.  This was removed in the emergency department.  Is important follow-up with a foot specialist, a podiatrist.  Please contact your insurance to find podiatrists who are in network for you.  Referral has been placed to ensure that this should be covered.    For pain you can take acetaminophen (Tylenol), 1000mg every 8 hours as needed for pain. Do not take more than 3000mg of acetaminophen in any 24 hour period. You can also take  ibuprofen (Motrin), 600mg every 6 hours as needed for pain (take with food to avoid GI upset).  Taking these medications regularly during the day can be very effective in controlling pain.    I recommend using Vaseline on the nailbed to help keep it soft and supple    Return if you develop:  Spreading redness, worsening pain over time, any other new or concerning findings

## 2025-03-25 NOTE — ED TRIAGE NOTES
"Chief Complaint   Patient presents with    Digit Pain     Right great toe   Pt states he thinks its an ingrown toe nail   Swelling and pain in toe       BP (!) 159/117   Pulse 90   Temp 36.1 °C (97 °F) (Temporal)   Resp 16   Ht 1.803 m (5' 11\")   Wt 95.8 kg (211 lb 3.2 oz)   SpO2 95%   BMI 29.46 kg/m²     "

## 2025-03-25 NOTE — ED NOTES
Discharge instructions provided. Pt verbalized understanding of discharge instructions to follow up with podiatry and to return to ER if condition worsens. Pt ambulated out of ER without difficulty.

## 2025-03-25 NOTE — ED NOTES
ERP at bedside. Pt agrees with plan of care discussed by ERP. Arsen in low position, side rail up for pt safety. Call light within reach. Plan of care on-going

## 2025-06-01 ENCOUNTER — APPOINTMENT (OUTPATIENT)
Dept: RADIOLOGY | Facility: MEDICAL CENTER | Age: 35
End: 2025-06-01
Attending: EMERGENCY MEDICINE

## 2025-06-01 ENCOUNTER — HOSPITAL ENCOUNTER (EMERGENCY)
Facility: MEDICAL CENTER | Age: 35
End: 2025-06-01
Attending: EMERGENCY MEDICINE

## 2025-06-01 VITALS
DIASTOLIC BLOOD PRESSURE: 106 MMHG | OXYGEN SATURATION: 94 % | BODY MASS INDEX: 27.78 KG/M2 | TEMPERATURE: 97.5 F | SYSTOLIC BLOOD PRESSURE: 161 MMHG | RESPIRATION RATE: 19 BRPM | WEIGHT: 198.41 LBS | HEIGHT: 71 IN | HEART RATE: 95 BPM

## 2025-06-01 DIAGNOSIS — K29.00 ACUTE GASTRITIS WITHOUT HEMORRHAGE, UNSPECIFIED GASTRITIS TYPE: ICD-10-CM

## 2025-06-01 DIAGNOSIS — K85.90 ACUTE PANCREATITIS, UNSPECIFIED COMPLICATION STATUS, UNSPECIFIED PANCREATITIS TYPE: Primary | ICD-10-CM

## 2025-06-01 LAB
ALBUMIN SERPL BCP-MCNC: 4.2 G/DL (ref 3.2–4.9)
ALBUMIN/GLOB SERPL: 1.6 G/DL
ALP SERPL-CCNC: 98 U/L (ref 30–99)
ALT SERPL-CCNC: 56 U/L (ref 2–50)
ANION GAP SERPL CALC-SCNC: 14 MMOL/L (ref 7–16)
AST SERPL-CCNC: 37 U/L (ref 12–45)
BASOPHILS # BLD AUTO: 0.8 % (ref 0–1.8)
BASOPHILS # BLD: 0.1 K/UL (ref 0–0.12)
BILIRUB SERPL-MCNC: 1.4 MG/DL (ref 0.1–1.5)
BUN SERPL-MCNC: 16 MG/DL (ref 8–22)
CALCIUM ALBUM COR SERPL-MCNC: 8.7 MG/DL (ref 8.5–10.5)
CALCIUM SERPL-MCNC: 8.9 MG/DL (ref 8.5–10.5)
CHLORIDE SERPL-SCNC: 101 MMOL/L (ref 96–112)
CO2 SERPL-SCNC: 21 MMOL/L (ref 20–33)
CREAT SERPL-MCNC: 0.97 MG/DL (ref 0.5–1.4)
EKG IMPRESSION: NORMAL
EOSINOPHIL # BLD AUTO: 0.15 K/UL (ref 0–0.51)
EOSINOPHIL NFR BLD: 1.2 % (ref 0–6.9)
ERYTHROCYTE [DISTWIDTH] IN BLOOD BY AUTOMATED COUNT: 41 FL (ref 35.9–50)
GFR SERPLBLD CREATININE-BSD FMLA CKD-EPI: 104 ML/MIN/1.73 M 2
GLOBULIN SER CALC-MCNC: 2.6 G/DL (ref 1.9–3.5)
GLUCOSE SERPL-MCNC: 111 MG/DL (ref 65–99)
HCT VFR BLD AUTO: 49 % (ref 42–52)
HGB BLD-MCNC: 17.6 G/DL (ref 14–18)
IMM GRANULOCYTES # BLD AUTO: 0.05 K/UL (ref 0–0.11)
IMM GRANULOCYTES NFR BLD AUTO: 0.4 % (ref 0–0.9)
LIPASE SERPL-CCNC: 109 U/L (ref 11–82)
LYMPHOCYTES # BLD AUTO: 1.88 K/UL (ref 1–4.8)
LYMPHOCYTES NFR BLD: 15.6 % (ref 22–41)
MCH RBC QN AUTO: 32.7 PG (ref 27–33)
MCHC RBC AUTO-ENTMCNC: 35.9 G/DL (ref 32.3–36.5)
MCV RBC AUTO: 90.9 FL (ref 81.4–97.8)
MONOCYTES # BLD AUTO: 0.67 K/UL (ref 0–0.85)
MONOCYTES NFR BLD AUTO: 5.6 % (ref 0–13.4)
NEUTROPHILS # BLD AUTO: 9.17 K/UL (ref 1.82–7.42)
NEUTROPHILS NFR BLD: 76.4 % (ref 44–72)
NRBC # BLD AUTO: 0 K/UL
NRBC BLD-RTO: 0 /100 WBC (ref 0–0.2)
PLATELET # BLD AUTO: 265 K/UL (ref 164–446)
PMV BLD AUTO: 9.6 FL (ref 9–12.9)
POTASSIUM SERPL-SCNC: 3.6 MMOL/L (ref 3.6–5.5)
PROT SERPL-MCNC: 6.8 G/DL (ref 6–8.2)
RBC # BLD AUTO: 5.39 M/UL (ref 4.7–6.1)
SODIUM SERPL-SCNC: 136 MMOL/L (ref 135–145)
TROPONIN T SERPL-MCNC: <6 NG/L (ref 6–19)
WBC # BLD AUTO: 12 K/UL (ref 4.8–10.8)

## 2025-06-01 PROCEDURE — 93005 ELECTROCARDIOGRAM TRACING: CPT | Mod: TC | Performed by: EMERGENCY MEDICINE

## 2025-06-01 PROCEDURE — 83690 ASSAY OF LIPASE: CPT

## 2025-06-01 PROCEDURE — A9270 NON-COVERED ITEM OR SERVICE: HCPCS | Performed by: EMERGENCY MEDICINE

## 2025-06-01 PROCEDURE — 93005 ELECTROCARDIOGRAM TRACING: CPT | Mod: TC

## 2025-06-01 PROCEDURE — 76705 ECHO EXAM OF ABDOMEN: CPT

## 2025-06-01 PROCEDURE — 700101 HCHG RX REV CODE 250: Performed by: EMERGENCY MEDICINE

## 2025-06-01 PROCEDURE — 85025 COMPLETE CBC W/AUTO DIFF WBC: CPT

## 2025-06-01 PROCEDURE — 80053 COMPREHEN METABOLIC PANEL: CPT

## 2025-06-01 PROCEDURE — 99285 EMERGENCY DEPT VISIT HI MDM: CPT

## 2025-06-01 PROCEDURE — 84484 ASSAY OF TROPONIN QUANT: CPT

## 2025-06-01 PROCEDURE — 700111 HCHG RX REV CODE 636 W/ 250 OVERRIDE (IP): Performed by: EMERGENCY MEDICINE

## 2025-06-01 PROCEDURE — 36415 COLL VENOUS BLD VENIPUNCTURE: CPT

## 2025-06-01 PROCEDURE — 96374 THER/PROPH/DIAG INJ IV PUSH: CPT

## 2025-06-01 PROCEDURE — 96375 TX/PRO/DX INJ NEW DRUG ADDON: CPT

## 2025-06-01 PROCEDURE — 700102 HCHG RX REV CODE 250 W/ 637 OVERRIDE(OP): Performed by: EMERGENCY MEDICINE

## 2025-06-01 PROCEDURE — 71045 X-RAY EXAM CHEST 1 VIEW: CPT

## 2025-06-01 PROCEDURE — 94760 N-INVAS EAR/PLS OXIMETRY 1: CPT

## 2025-06-01 RX ORDER — MORPHINE SULFATE 4 MG/ML
4 INJECTION INTRAVENOUS ONCE
Status: COMPLETED | OUTPATIENT
Start: 2025-06-01 | End: 2025-06-01

## 2025-06-01 RX ORDER — PANTOPRAZOLE SODIUM 20 MG/1
20 TABLET, DELAYED RELEASE ORAL DAILY
Qty: 30 TABLET | Refills: 0 | Status: SHIPPED | OUTPATIENT
Start: 2025-06-01

## 2025-06-01 RX ORDER — OXYCODONE HYDROCHLORIDE 5 MG/1
5 TABLET ORAL EVERY 4 HOURS PRN
Qty: 15 TABLET | Refills: 0 | Status: SHIPPED | OUTPATIENT
Start: 2025-06-01 | End: 2025-06-06

## 2025-06-01 RX ORDER — LABETALOL HYDROCHLORIDE 5 MG/ML
20 INJECTION, SOLUTION INTRAVENOUS ONCE
Status: COMPLETED | OUTPATIENT
Start: 2025-06-01 | End: 2025-06-01

## 2025-06-01 RX ORDER — ONDANSETRON 2 MG/ML
4 INJECTION INTRAMUSCULAR; INTRAVENOUS ONCE
Status: COMPLETED | OUTPATIENT
Start: 2025-06-01 | End: 2025-06-01

## 2025-06-01 RX ADMIN — MORPHINE SULFATE 4 MG: 4 INJECTION INTRAVENOUS at 08:09

## 2025-06-01 RX ADMIN — FAMOTIDINE 20 MG: 10 INJECTION, SOLUTION INTRAVENOUS at 09:12

## 2025-06-01 RX ADMIN — HYOSCYAMINE SULFATE 30 ML: 0.12 ELIXIR ORAL at 08:07

## 2025-06-01 RX ADMIN — LABETALOL HYDROCHLORIDE 20 MG: 5 INJECTION INTRAVENOUS at 09:12

## 2025-06-01 RX ADMIN — ONDANSETRON 4 MG: 2 INJECTION INTRAMUSCULAR; INTRAVENOUS at 08:09

## 2025-06-01 ASSESSMENT — PAIN DESCRIPTION - PAIN TYPE: TYPE: ACUTE PAIN

## 2025-06-01 ASSESSMENT — FIBROSIS 4 INDEX: FIB4 SCORE: 0.56

## 2025-06-01 NOTE — ED PROVIDER NOTES
ED Provider Note    CHIEF COMPLAINT  Chief Complaint   Patient presents with    Abdominal Pain     Mid epigastric pain, onset 3d ago and getting worse. States worse after eating, often spiking to 10/10 intensity.   Denies ETOH or any substance abuse. Reports it may feel semi-indigestion-y.          HPI/ROS  LIMITATION TO HISTORY   None    Jhon Greco is a 35 y.o. male who presents with report that he has had some epigastric discomfort over the past 3 days that is now getting worse.  He is avoiding foods as it seems to spike with food.  Denies any alcohol or excessive use of NSAIDs.  States that he does eat a lot of spicy foods.  Denies any chest pain states that his epigastrium is what is uncomfortable but he does have some pain in the right upper quadrant as well especially after eating.  Denies any fever chills sweats or other complaints    PAST MEDICAL HISTORY   has a past medical history of Patient denies medical problems.    SURGICAL HISTORY   has a past surgical history that includes no pertinent past surgical history.    FAMILY HISTORY  History reviewed. No pertinent family history.    SOCIAL HISTORY  Social History     Tobacco Use    Smoking status: Never    Smokeless tobacco: Never   Vaping Use    Vaping status: Never Used   Substance and Sexual Activity    Alcohol use: Not Currently    Drug use: Not Currently    Sexual activity: Not on file       CURRENT MEDICATIONS  Home Medications       Reviewed by Casimiro Santana R.N. (Registered Nurse) on 06/01/25 at 0754  Med List Status: Not Addressed     Medication Last Dose Status   naproxen (NAPROSYN) 500 MG Tab  Active   naproxen (NAPROSYN) 500 MG Tab  Active   oseltamivir (TAMIFLU) 75 MG Cap  Active                  Audit from Redirected Encounters    **Home medications have not yet been reviewed for this encounter**         ALLERGIES  Allergies[1]    PHYSICAL EXAM  VITAL SIGNS: BP (!) 201/119   Pulse 94   Temp 36.4 °C (97.5 °F) (Temporal)   Resp 14    "Ht 1.803 m (5' 11\")   Wt 90 kg (198 lb 6.6 oz)   SpO2 96%   BMI 27.67 kg/m²    Constitutional: Well developed, Well nourished, No acute distress, Non-toxic appearance.   HENT: Normocephalic, Atraumatic, Bilateral external ears normal, Oropharynx is clear mucous membranes are moist. No oral exudates or nasal discharge.   Eyes: Pupils are equal round and reactive, EOMI, Conjunctiva normal, No discharge.   Neck: Normal range of motion, No tenderness, Supple, No stridor. No meningismus.  Lymphatic: No lymphadenopathy noted.   Cardiovascular: Regular rate and rhythm without murmur rub or gallop.  Thorax & Lungs: Clear breath sounds bilaterally without wheezes, rhonchi or rales. There is no chest wall tenderness.   Abdomen: Soft with epigastric tenderness and right upper quadrant tenderness as well with positive Bradshaw sign, abdomen is otherwise mildly obese but non-distended. There is no rebound or guarding. No organomegaly is appreciated. Bowel sounds are normal.  Skin: Normal without rash.   Back: No CVA or spinal tenderness.   Extremities: Intact distal pulses, No edema, No tenderness, No cyanosis, No clubbing. Capillary refill is less than 2 seconds.  Musculoskeletal: Good range of motion in all major joints. No tenderness to palpation or major deformities noted.   Neurologic: Alert & oriented x 3, Normal motor function, Normal sensory function, No focal deficits noted. Reflexes are normal.  Psychiatric: Affect normal, Judgment normal, Mood normal. There is no suicidal ideation or patient reported hallucinations.       EKG/LABS  Results for orders placed or performed during the hospital encounter of 25   EKG   Result Value Ref Range    Report       [facility] Emergency Dept.    Test Date:  2025  Pt Name:    EMILY REGALADO                Department: [facility]  MRN:        [id number]                      Room:       -ROOM 1  Gender:     Male                         Technician: bandar  : "        1990                   Requested By:ER TRIAGE PROTOCOL  Order #:    [id number]                    Reading MD: JOSE GUADALUPE MAYFIELD MD    Measurements  Intervals                                Axis  Rate:       93                           P:          49  NE:         133                          QRS:        55  QRSD:       79                           T:          10  QT:         335  QTc:        417    Interpretive Statements  Sinus rhythm  ST elev, probable normal early repol pattern  No previous ECG available for comparison  Electronically Signed On 06- 08:23:21 PDT by JOSE GUADALUPE MYAFIELD MD         I have independently interpreted this EKG    RADIOLOGY/PROCEDURES   I have independently interpreted the diagnostic imaging associated with this visit and am waiting the final reading from the radiologist.   My preliminary interpretation is as follows: No acute airspace disease such as a lower lobe pneumonia    Radiologist interpretation:  US-RUQ   Final Result      1.  Hepatic steatosis.   2.  Otherwise, normal.      DX-CHEST-PORTABLE (1 VIEW)   Final Result      No acute process.          COURSE & MEDICAL DECISION MAKING    ASSESSMENT, COURSE AND PLAN  Care Narrative:     EKG demonstrates no evidence of ischemic change or dysrhythmia and once I learned about his story I am less concerned about his heart.  More concerned about pancreatitis or gallbladder disease as well as gastritis.    Gave him a GI cocktail which seemed to improve things.  Laboratory evaluation was remarkable for elevated lipase without evidence of biliary obstruction and a white count elevated at 12,000 with 76% PMN shift but no acidosis.    Right upper quadrant ultrasound shows no evidence of gallstones or dilated duct.  My conclusion is this is likely a couple of processes.  He seems to have some pancreatitis coupled with some gastritis or GERD.  I will treat both with diet and medication.  Prescribed Oxy for pain control in the coming 2 to 3  days as well as Protonix to inhibit acid production and placing them on essentially a pancreatitis and gallbladder diet    Discharged in stable condition encouraged to return if any significant change in symptoms but he will be attentive to diet measures for at least a couple weeks even after pain-free    DISPOSITION AND DISCUSSIONS  Escalation of care considered, and ultimately not performed:acute inpatient care management, however at this time, the patient is most appropriate for outpatient management  Patient seems to be tolerating clear liquids.  I think he can hydrate at home and his pancreatitis will calm down.  I think we can avoid admission at this time but it was considered    Barriers to care at this time, including but not limited to: Patient does not have established PCP.     Decision tools and prescription drugs considered including, but not limited to: Pain Medications I gave him a small amount of opiates as I believe NSAIDs are contraindicated in his presentation and that other OTCs would be an adequate for pain control.    FINAL DIAGNOSIS  1. Acute pancreatitis, unspecified complication status, unspecified pancreatitis type    2. Acute gastritis without hemorrhage, unspecified gastritis type         Electronically signed by: Pato Oden M.D., 6/1/2025 8:17 AM           [1] No Known Allergies

## 2025-06-01 NOTE — ED TRIAGE NOTES
"Chief Complaint   Patient presents with    Abdominal Pain     Mid epigastric pain, onset 3d ago and getting worse. States worse after eating, often spiking to 10/10 intensity.   Denies ETOH or any substance abuse. Reports it may feel semi-indigestion-y.      Blood Pressure: (!) 201/119, Pulse: 94, Respiration: 14, Temperature: 36.4 °C (97.5 °F), Height: 180.3 cm (5' 11\"), Weight: 90 kg (198 lb 6.6 oz), BMI (Calculated): 27.67, BSA (Calculated): 2.1, Pulse Oximetry: 96 %, O2 Delivery Device: None - Room Air  "

## 2025-06-01 NOTE — ED NOTES
Brought back to rm 1, changed into gown. Hooked up to room monitor.     Dr Oden presents to the bedside to engage in rodrigo conversation regarding the patient's reason for presenting to the ED and POC.

## 2025-06-01 NOTE — ED NOTES
Provided DC instructions, outpt f/u with PCP ([facility]), discussed home BP monitoring, PIV removed, questions answered.

## 2025-06-01 NOTE — DISCHARGE INSTRUCTIONS
Please avoid spicy foods and fatty foods for at least a couple weeks after you are pain-free    Avoid ibuprofen, Motrin, Aleve.  Tylenol is okay  Oxycodone if significant pain develops especially at night  Return if fever and worsening pain in spite of therapy